# Patient Record
Sex: FEMALE | Race: WHITE | URBAN - METROPOLITAN AREA
[De-identification: names, ages, dates, MRNs, and addresses within clinical notes are randomized per-mention and may not be internally consistent; named-entity substitution may affect disease eponyms.]

---

## 2024-04-04 ENCOUNTER — APPOINTMENT (OUTPATIENT)
Dept: GENERAL RADIOLOGY | Age: 44
DRG: 917 | End: 2024-04-04
Payer: MEDICARE

## 2024-04-04 ENCOUNTER — HOSPITAL ENCOUNTER (INPATIENT)
Age: 44
LOS: 5 days | Discharge: PSYCHIATRIC OR SUBSTANCE USE FACILITY | DRG: 917 | End: 2024-04-09
Attending: EMERGENCY MEDICINE | Admitting: INTERNAL MEDICINE
Payer: MEDICARE

## 2024-04-04 ENCOUNTER — APPOINTMENT (OUTPATIENT)
Dept: CT IMAGING | Age: 44
DRG: 917 | End: 2024-04-04
Payer: MEDICARE

## 2024-04-04 DIAGNOSIS — R41.82 ALTERED MENTAL STATUS, UNSPECIFIED ALTERED MENTAL STATUS TYPE: Primary | ICD-10-CM

## 2024-04-04 LAB
ALBUMIN SERPL-MCNC: 4.8 G/DL (ref 3.5–5.2)
ALBUMIN/GLOB SERPL: 2 {RATIO} (ref 1–2.5)
ALP SERPL-CCNC: 65 U/L (ref 35–104)
ALT SERPL-CCNC: 25 U/L (ref 10–35)
AMPHET UR QL SCN: NEGATIVE
ANION GAP SERPL CALCULATED.3IONS-SCNC: 13 MMOL/L (ref 9–16)
APAP SERPL-MCNC: <5 UG/ML (ref 10–30)
AST SERPL-CCNC: 25 U/L (ref 10–35)
BACTERIA URNS QL MICRO: NORMAL
BARBITURATES UR QL SCN: NEGATIVE
BASOPHILS # BLD: 0.04 K/UL (ref 0–0.2)
BASOPHILS NFR BLD: 1 % (ref 0–2)
BENZODIAZ UR QL: NEGATIVE
BILIRUB DIRECT SERPL-MCNC: <0.2 MG/DL (ref 0–0.3)
BILIRUB INDIRECT SERPL-MCNC: 0.3 MG/DL (ref 0–1)
BILIRUB SERPL-MCNC: 0.5 MG/DL (ref 0–1.2)
BILIRUB UR QL STRIP: NEGATIVE
BUN BLD-MCNC: 11 MG/DL (ref 8–26)
BUN SERPL-MCNC: 11 MG/DL (ref 6–20)
CA-I BLD-SCNC: 1.25 MMOL/L (ref 1.15–1.33)
CALCIUM SERPL-MCNC: 9.9 MG/DL (ref 8.6–10.4)
CANNABINOIDS UR QL SCN: POSITIVE
CASTS #/AREA URNS LPF: NORMAL /LPF (ref 0–8)
CHLORIDE BLD-SCNC: 108 MMOL/L (ref 98–107)
CHLORIDE SERPL-SCNC: 105 MMOL/L (ref 98–107)
CLARITY UR: ABNORMAL
CO2 BLD CALC-SCNC: 27 MMOL/L (ref 22–30)
CO2 SERPL-SCNC: 23 MMOL/L (ref 20–31)
COCAINE UR QL SCN: NEGATIVE
COLOR UR: YELLOW
CREAT SERPL-MCNC: 0.6 MG/DL (ref 0.5–0.9)
EGFR, POC: 61 ML/MIN/1.73M2
EOSINOPHIL # BLD: 0.04 K/UL (ref 0–0.44)
EOSINOPHILS RELATIVE PERCENT: 1 % (ref 1–4)
EPI CELLS #/AREA URNS HPF: NORMAL /HPF (ref 0–5)
ERYTHROCYTE [DISTWIDTH] IN BLOOD BY AUTOMATED COUNT: 12.7 % (ref 11.8–14.4)
ETHANOL PERCENT: <0.01 %
ETHANOLAMINE SERPL-MCNC: <10 MG/DL
FENTANYL UR QL: NEGATIVE
FIO2: 100
GFR SERPL CREATININE-BSD FRML MDRD: 60 ML/MIN/1.73M2
GLOBULIN SER CALC-MCNC: 2.3 G/DL
GLUCOSE BLD-MCNC: 114 MG/DL (ref 74–100)
GLUCOSE BLD-MCNC: 124 MG/DL (ref 74–100)
GLUCOSE SERPL-MCNC: 121 MG/DL (ref 74–99)
GLUCOSE UR STRIP-MCNC: NEGATIVE MG/DL
HCG SERPL QL: NEGATIVE
HCO3 VENOUS: 27.9 MMOL/L (ref 22–29)
HCT VFR BLD AUTO: 41 % (ref 36–46)
HCT VFR BLD AUTO: 41.1 % (ref 36.3–47.1)
HGB BLD-MCNC: 13.8 G/DL (ref 11.9–15.1)
HGB UR QL STRIP.AUTO: NEGATIVE
IMM GRANULOCYTES # BLD AUTO: 0.03 K/UL (ref 0–0.3)
IMM GRANULOCYTES NFR BLD: 0 %
INR PPP: 1
KETONES UR STRIP-MCNC: NEGATIVE MG/DL
LEUKOCYTE ESTERASE UR QL STRIP: NEGATIVE
LIPASE SERPL-CCNC: 27 U/L (ref 13–60)
LITHIUM LEVEL: 0.3 MMOL/L (ref 0.6–1.2)
LYMPHOCYTES NFR BLD: 1.13 K/UL (ref 1.1–3.7)
LYMPHOCYTES RELATIVE PERCENT: 13 % (ref 24–43)
MAGNESIUM SERPL-MCNC: 2.4 MG/DL (ref 1.6–2.6)
MCH RBC QN AUTO: 33.6 PG (ref 25.2–33.5)
MCHC RBC AUTO-ENTMCNC: 33.6 G/DL (ref 28.4–34.8)
MCV RBC AUTO: 100 FL (ref 82.6–102.9)
METHADONE UR QL: NEGATIVE
MODE: ABNORMAL
MONOCYTES NFR BLD: 0.5 K/UL (ref 0.1–1.2)
MONOCYTES NFR BLD: 6 % (ref 3–12)
NEGATIVE BASE EXCESS, ART: 1.1 MMOL/L (ref 0–2)
NEUTROPHILS NFR BLD: 79 % (ref 36–65)
NEUTS SEG NFR BLD: 6.96 K/UL (ref 1.5–8.1)
NITRITE UR QL STRIP: NEGATIVE
NRBC BLD-RTO: 0 PER 100 WBC
O2 DELIVERY DEVICE: ABNORMAL
O2 SAT, VEN: 50 % (ref 60–85)
OPIATES UR QL SCN: NEGATIVE
OXYCODONE UR QL SCN: NEGATIVE
PCO2, VEN: 37.3 MM HG (ref 41–51)
PCP UR QL SCN: NEGATIVE
PH UR STRIP: 7.5 [PH] (ref 5–8)
PH VENOUS: 7.48 (ref 7.32–7.43)
PLATELET # BLD AUTO: 280 K/UL (ref 138–453)
PMV BLD AUTO: 8.5 FL (ref 8.1–13.5)
PO2, VEN: 24.7 MM HG (ref 30–50)
POC ANION GAP: 8 MMOL/L (ref 7–16)
POC CREATININE: 0.6 MG/DL (ref 0.51–1.19)
POC HCO3: 24 MMOL/L (ref 21–28)
POC HEMOGLOBIN (CALC): 13.9 G/DL (ref 12–16)
POC LACTIC ACID: 0.8 MMOL/L (ref 0.56–1.39)
POC O2 SATURATION: 100 % (ref 94–98)
POC PCO2: 40.7 MM HG (ref 35–48)
POC PH: 7.38 (ref 7.35–7.45)
POC PO2: 648 MM HG (ref 83–108)
POSITIVE BASE EXCESS, VEN: 4.3 MMOL/L (ref 0–3)
POTASSIUM BLD-SCNC: 4.9 MMOL/L (ref 3.5–4.5)
POTASSIUM SERPL-SCNC: 4 MMOL/L (ref 3.7–5.3)
PROT SERPL-MCNC: 7.1 G/DL (ref 6.6–8.7)
PROT UR STRIP-MCNC: ABNORMAL MG/DL
PROTHROMBIN TIME: 13.4 SEC (ref 11.7–14.9)
RBC # BLD AUTO: 4.11 M/UL (ref 3.95–5.11)
RBC #/AREA URNS HPF: NORMAL /HPF (ref 0–4)
SALICYLATES SERPL-MCNC: <0.5 MG/DL (ref 0–10)
SODIUM BLD-SCNC: 142 MMOL/L (ref 138–146)
SODIUM SERPL-SCNC: 141 MMOL/L (ref 136–145)
SP GR UR STRIP: 1.01 (ref 1–1.03)
TEST INFORMATION: ABNORMAL
TROPONIN I SERPL HS-MCNC: <6 NG/L (ref 0–14)
TROPONIN I SERPL HS-MCNC: <6 NG/L (ref 0–14)
TSH SERPL DL<=0.05 MIU/L-ACNC: 3.49 UIU/ML (ref 0.27–4.2)
UROBILINOGEN UR STRIP-ACNC: NORMAL EU/DL (ref 0–1)
WBC #/AREA URNS HPF: NORMAL /HPF (ref 0–5)
WBC OTHER # BLD: 8.7 K/UL (ref 3.5–11.3)

## 2024-04-04 PROCEDURE — 84520 ASSAY OF UREA NITROGEN: CPT

## 2024-04-04 PROCEDURE — 2580000003 HC RX 258

## 2024-04-04 PROCEDURE — G0480 DRUG TEST DEF 1-7 CLASSES: HCPCS

## 2024-04-04 PROCEDURE — 85025 COMPLETE CBC W/AUTO DIFF WBC: CPT

## 2024-04-04 PROCEDURE — 83605 ASSAY OF LACTIC ACID: CPT

## 2024-04-04 PROCEDURE — 87205 SMEAR GRAM STAIN: CPT

## 2024-04-04 PROCEDURE — 71045 X-RAY EXAM CHEST 1 VIEW: CPT

## 2024-04-04 PROCEDURE — 82947 ASSAY GLUCOSE BLOOD QUANT: CPT

## 2024-04-04 PROCEDURE — 6360000002 HC RX W HCPCS: Performed by: STUDENT IN AN ORGANIZED HEALTH CARE EDUCATION/TRAINING PROGRAM

## 2024-04-04 PROCEDURE — 87040 BLOOD CULTURE FOR BACTERIA: CPT

## 2024-04-04 PROCEDURE — 74018 RADEX ABDOMEN 1 VIEW: CPT

## 2024-04-04 PROCEDURE — 36415 COLL VENOUS BLD VENIPUNCTURE: CPT

## 2024-04-04 PROCEDURE — 6370000000 HC RX 637 (ALT 250 FOR IP)

## 2024-04-04 PROCEDURE — 84703 CHORIONIC GONADOTROPIN ASSAY: CPT

## 2024-04-04 PROCEDURE — 80143 DRUG ASSAY ACETAMINOPHEN: CPT

## 2024-04-04 PROCEDURE — 2700000000 HC OXYGEN THERAPY PER DAY

## 2024-04-04 PROCEDURE — 82330 ASSAY OF CALCIUM: CPT

## 2024-04-04 PROCEDURE — 84484 ASSAY OF TROPONIN QUANT: CPT

## 2024-04-04 PROCEDURE — 85610 PROTHROMBIN TIME: CPT

## 2024-04-04 PROCEDURE — 36600 WITHDRAWAL OF ARTERIAL BLOOD: CPT

## 2024-04-04 PROCEDURE — 6360000002 HC RX W HCPCS

## 2024-04-04 PROCEDURE — 80178 ASSAY OF LITHIUM: CPT

## 2024-04-04 PROCEDURE — 81001 URINALYSIS AUTO W/SCOPE: CPT

## 2024-04-04 PROCEDURE — 82803 BLOOD GASES ANY COMBINATION: CPT

## 2024-04-04 PROCEDURE — 87186 SC STD MICRODIL/AGAR DIL: CPT

## 2024-04-04 PROCEDURE — 94002 VENT MGMT INPAT INIT DAY: CPT

## 2024-04-04 PROCEDURE — 96375 TX/PRO/DX INJ NEW DRUG ADDON: CPT

## 2024-04-04 PROCEDURE — 87086 URINE CULTURE/COLONY COUNT: CPT

## 2024-04-04 PROCEDURE — 87077 CULTURE AEROBIC IDENTIFY: CPT

## 2024-04-04 PROCEDURE — 87070 CULTURE OTHR SPECIMN AEROBIC: CPT

## 2024-04-04 PROCEDURE — 70450 CT HEAD/BRAIN W/O DYE: CPT

## 2024-04-04 PROCEDURE — 84443 ASSAY THYROID STIM HORMONE: CPT

## 2024-04-04 PROCEDURE — 80048 BASIC METABOLIC PNL TOTAL CA: CPT

## 2024-04-04 PROCEDURE — 80307 DRUG TEST PRSMV CHEM ANLYZR: CPT

## 2024-04-04 PROCEDURE — 87641 MR-STAPH DNA AMP PROBE: CPT

## 2024-04-04 PROCEDURE — 2500000003 HC RX 250 WO HCPCS

## 2024-04-04 PROCEDURE — 85014 HEMATOCRIT: CPT

## 2024-04-04 PROCEDURE — 2000000000 HC ICU R&B

## 2024-04-04 PROCEDURE — 80076 HEPATIC FUNCTION PANEL: CPT

## 2024-04-04 PROCEDURE — 80179 DRUG ASSAY SALICYLATE: CPT

## 2024-04-04 PROCEDURE — 93005 ELECTROCARDIOGRAM TRACING: CPT | Performed by: INTERNAL MEDICINE

## 2024-04-04 PROCEDURE — 83690 ASSAY OF LIPASE: CPT

## 2024-04-04 PROCEDURE — 2500000003 HC RX 250 WO HCPCS: Performed by: STUDENT IN AN ORGANIZED HEALTH CARE EDUCATION/TRAINING PROGRAM

## 2024-04-04 PROCEDURE — 99285 EMERGENCY DEPT VISIT HI MDM: CPT

## 2024-04-04 PROCEDURE — 83735 ASSAY OF MAGNESIUM: CPT

## 2024-04-04 PROCEDURE — 94761 N-INVAS EAR/PLS OXIMETRY MLT: CPT

## 2024-04-04 PROCEDURE — 2580000003 HC RX 258: Performed by: STUDENT IN AN ORGANIZED HEALTH CARE EDUCATION/TRAINING PROGRAM

## 2024-04-04 PROCEDURE — 96374 THER/PROPH/DIAG INJ IV PUSH: CPT

## 2024-04-04 PROCEDURE — 82565 ASSAY OF CREATININE: CPT

## 2024-04-04 PROCEDURE — 80051 ELECTROLYTE PANEL: CPT

## 2024-04-04 RX ORDER — ACTIVATED CHARCOAL 208 MG/ML
SUSPENSION ORAL
Status: COMPLETED
Start: 2024-04-04 | End: 2024-04-04

## 2024-04-04 RX ORDER — SODIUM CHLORIDE 0.9 % (FLUSH) 0.9 %
5-40 SYRINGE (ML) INJECTION EVERY 12 HOURS SCHEDULED
Status: DISCONTINUED | OUTPATIENT
Start: 2024-04-04 | End: 2024-04-09 | Stop reason: HOSPADM

## 2024-04-04 RX ORDER — ONDANSETRON 4 MG/1
4 TABLET, ORALLY DISINTEGRATING ORAL EVERY 8 HOURS PRN
Status: DISCONTINUED | OUTPATIENT
Start: 2024-04-04 | End: 2024-04-09 | Stop reason: HOSPADM

## 2024-04-04 RX ORDER — 0.9 % SODIUM CHLORIDE 0.9 %
1000 INTRAVENOUS SOLUTION INTRAVENOUS ONCE
Status: COMPLETED | OUTPATIENT
Start: 2024-04-04 | End: 2024-04-04

## 2024-04-04 RX ORDER — ACTIVATED CHARCOAL 208 MG/ML
25 SUSPENSION ORAL ONCE
Status: COMPLETED | OUTPATIENT
Start: 2024-04-04 | End: 2024-04-04

## 2024-04-04 RX ORDER — ACETAMINOPHEN 325 MG/1
650 TABLET ORAL EVERY 6 HOURS PRN
Status: DISCONTINUED | OUTPATIENT
Start: 2024-04-04 | End: 2024-04-09 | Stop reason: HOSPADM

## 2024-04-04 RX ORDER — SODIUM CHLORIDE, SODIUM LACTATE, POTASSIUM CHLORIDE, CALCIUM CHLORIDE 600; 310; 30; 20 MG/100ML; MG/100ML; MG/100ML; MG/100ML
INJECTION, SOLUTION INTRAVENOUS CONTINUOUS
Status: DISCONTINUED | OUTPATIENT
Start: 2024-04-04 | End: 2024-04-09 | Stop reason: HOSPADM

## 2024-04-04 RX ORDER — ONDANSETRON 2 MG/ML
4 INJECTION INTRAMUSCULAR; INTRAVENOUS ONCE
Status: COMPLETED | OUTPATIENT
Start: 2024-04-04 | End: 2024-04-04

## 2024-04-04 RX ORDER — POTASSIUM CHLORIDE 7.45 MG/ML
10 INJECTION INTRAVENOUS PRN
Status: DISCONTINUED | OUTPATIENT
Start: 2024-04-04 | End: 2024-04-09 | Stop reason: HOSPADM

## 2024-04-04 RX ORDER — HYDROXYZINE HYDROCHLORIDE 50 MG/ML
25 INJECTION, SOLUTION INTRAMUSCULAR ONCE
Status: DISCONTINUED | OUTPATIENT
Start: 2024-04-04 | End: 2024-04-04

## 2024-04-04 RX ORDER — FENTANYL CITRATE 50 UG/ML
INJECTION, SOLUTION INTRAMUSCULAR; INTRAVENOUS
Status: COMPLETED
Start: 2024-04-04 | End: 2024-04-04

## 2024-04-04 RX ORDER — SODIUM CHLORIDE 9 MG/ML
INJECTION, SOLUTION INTRAVENOUS PRN
Status: DISCONTINUED | OUTPATIENT
Start: 2024-04-04 | End: 2024-04-09 | Stop reason: HOSPADM

## 2024-04-04 RX ORDER — FENTANYL CITRATE 50 UG/ML
100 INJECTION, SOLUTION INTRAMUSCULAR; INTRAVENOUS ONCE
Status: COMPLETED | OUTPATIENT
Start: 2024-04-04 | End: 2024-04-04

## 2024-04-04 RX ORDER — ETOMIDATE 2 MG/ML
20 INJECTION INTRAVENOUS ONCE
Status: COMPLETED | OUTPATIENT
Start: 2024-04-04 | End: 2024-04-04

## 2024-04-04 RX ORDER — PROPOFOL 10 MG/ML
5-50 INJECTION, EMULSION INTRAVENOUS CONTINUOUS
Status: DISCONTINUED | OUTPATIENT
Start: 2024-04-04 | End: 2024-04-08

## 2024-04-04 RX ORDER — NALOXONE HYDROCHLORIDE 0.4 MG/ML
0.4 INJECTION, SOLUTION INTRAMUSCULAR; INTRAVENOUS; SUBCUTANEOUS ONCE
Status: COMPLETED | OUTPATIENT
Start: 2024-04-04 | End: 2024-04-04

## 2024-04-04 RX ORDER — ACETAMINOPHEN 650 MG/1
650 SUPPOSITORY RECTAL EVERY 6 HOURS PRN
Status: DISCONTINUED | OUTPATIENT
Start: 2024-04-04 | End: 2024-04-09 | Stop reason: HOSPADM

## 2024-04-04 RX ORDER — PROPOFOL 10 MG/ML
5-50 INJECTION, EMULSION INTRAVENOUS CONTINUOUS
Status: DISCONTINUED | OUTPATIENT
Start: 2024-04-04 | End: 2024-04-04

## 2024-04-04 RX ORDER — SODIUM CHLORIDE 0.9 % (FLUSH) 0.9 %
5-40 SYRINGE (ML) INJECTION PRN
Status: DISCONTINUED | OUTPATIENT
Start: 2024-04-04 | End: 2024-04-09 | Stop reason: HOSPADM

## 2024-04-04 RX ORDER — ONDANSETRON 2 MG/ML
INJECTION INTRAMUSCULAR; INTRAVENOUS
Status: COMPLETED
Start: 2024-04-04 | End: 2024-04-04

## 2024-04-04 RX ORDER — NALOXONE HYDROCHLORIDE 0.4 MG/ML
INJECTION, SOLUTION INTRAMUSCULAR; INTRAVENOUS; SUBCUTANEOUS
Status: COMPLETED
Start: 2024-04-04 | End: 2024-04-04

## 2024-04-04 RX ORDER — POTASSIUM CHLORIDE 29.8 MG/ML
20 INJECTION INTRAVENOUS PRN
Status: DISCONTINUED | OUTPATIENT
Start: 2024-04-04 | End: 2024-04-09 | Stop reason: HOSPADM

## 2024-04-04 RX ORDER — SUCCINYLCHOLINE CHLORIDE 20 MG/ML
100 INJECTION INTRAMUSCULAR; INTRAVENOUS ONCE
Status: COMPLETED | OUTPATIENT
Start: 2024-04-04 | End: 2024-04-04

## 2024-04-04 RX ORDER — PROPOFOL 10 MG/ML
INJECTION, EMULSION INTRAVENOUS
Status: COMPLETED
Start: 2024-04-04 | End: 2024-04-04

## 2024-04-04 RX ORDER — ONDANSETRON 2 MG/ML
4 INJECTION INTRAMUSCULAR; INTRAVENOUS EVERY 6 HOURS PRN
Status: DISCONTINUED | OUTPATIENT
Start: 2024-04-04 | End: 2024-04-09 | Stop reason: HOSPADM

## 2024-04-04 RX ORDER — POLYETHYLENE GLYCOL 3350 17 G/17G
17 POWDER, FOR SOLUTION ORAL DAILY PRN
Status: DISCONTINUED | OUTPATIENT
Start: 2024-04-04 | End: 2024-04-09 | Stop reason: HOSPADM

## 2024-04-04 RX ORDER — MAGNESIUM SULFATE IN WATER 40 MG/ML
2000 INJECTION, SOLUTION INTRAVENOUS PRN
Status: DISCONTINUED | OUTPATIENT
Start: 2024-04-04 | End: 2024-04-09 | Stop reason: HOSPADM

## 2024-04-04 RX ORDER — ENOXAPARIN SODIUM 100 MG/ML
40 INJECTION SUBCUTANEOUS DAILY
Status: DISCONTINUED | OUTPATIENT
Start: 2024-04-05 | End: 2024-04-09 | Stop reason: HOSPADM

## 2024-04-04 RX ADMIN — SODIUM CHLORIDE, PRESERVATIVE FREE 10 ML: 5 INJECTION INTRAVENOUS at 23:42

## 2024-04-04 RX ADMIN — SODIUM CHLORIDE, POTASSIUM CHLORIDE, SODIUM LACTATE AND CALCIUM CHLORIDE: 600; 310; 30; 20 INJECTION, SOLUTION INTRAVENOUS at 22:18

## 2024-04-04 RX ADMIN — SODIUM CHLORIDE 3000 MG: 900 INJECTION INTRAVENOUS at 23:41

## 2024-04-04 RX ADMIN — FENTANYL CITRATE 100 MCG: 50 INJECTION, SOLUTION INTRAMUSCULAR; INTRAVENOUS at 18:28

## 2024-04-04 RX ADMIN — SODIUM CHLORIDE: 9 INJECTION, SOLUTION INTRAVENOUS at 23:38

## 2024-04-04 RX ADMIN — ACTIVATED CHARCOAL 25 G: 208 SUSPENSION ORAL at 18:42

## 2024-04-04 RX ADMIN — Medication 100 MG: at 18:07

## 2024-04-04 RX ADMIN — ONDANSETRON 4 MG: 2 INJECTION INTRAMUSCULAR; INTRAVENOUS at 17:43

## 2024-04-04 RX ADMIN — SODIUM CHLORIDE 1000 ML: 9 INJECTION, SOLUTION INTRAVENOUS at 18:43

## 2024-04-04 RX ADMIN — ETOMIDATE INJECTION 20 MG: 2 SOLUTION INTRAVENOUS at 18:06

## 2024-04-04 RX ADMIN — Medication 50 MCG/HR: at 18:59

## 2024-04-04 RX ADMIN — PROPOFOL 40 MCG/KG/MIN: 10 INJECTION, EMULSION INTRAVENOUS at 18:15

## 2024-04-04 RX ADMIN — NALOXONE HYDROCHLORIDE 0.4 MG: 0.4 INJECTION, SOLUTION INTRAMUSCULAR; INTRAVENOUS; SUBCUTANEOUS at 17:44

## 2024-04-04 RX ADMIN — NALXONE HYDROCHLORIDE 0.4 MG: 0.4 INJECTION INTRAMUSCULAR; INTRAVENOUS; SUBCUTANEOUS at 17:44

## 2024-04-04 ASSESSMENT — PULMONARY FUNCTION TESTS
PIF_VALUE: 13
PIF_VALUE: 16
PIF_VALUE: 18

## 2024-04-04 NOTE — ED PROVIDER NOTES
[facility]     Emergency Department     Faculty Attestation    I performed a history and physical examination of the patient and discussed management with the resident. I reviewed the resident’s note and agree with the documented findings and plan of care. Any areas of disagreement are noted on the chart. I was personally present for the key portions of any procedures. I have documented in the chart those procedures where I was not present during the key portions. I have reviewed the emergency nurses triage note. I agree with the chief complaint, past medical history, past surgical history, allergies, medications, social and family history as documented unless otherwise noted below.        For Physician Assistant/ Nurse Practitioner cases/documentation I have personally evaluated this patient and have completed at least one if not all key elements of the E/M (history, physical exam, and MDM). Additional findings are as noted.  I have personally seen and evaluated the patient.  I find the patient's history and physical exam are consistent with the NP/PA documentation.  I agree with the care provided, treatment rendered, disposition and follow-up plan.    The patient arrives from hospital floor where she was a visitor noted to be altered.  Little more is known at the time there is no family initially available. patient is clearly awake and is vomiting but withholding her vomiting in her mouth she is otherwise not communicating with us.  Behavior appears to be quite erratic at this time       1820  The patient continued to vomit continue to have episodes of desaturation into the 80s remained unresponsive and uncooperative at times with unusual behavior and at this point we did feel airway was at that risk in addition needs diagnostics  get the patient to CT scan and to further evaluations which were impossible with the spontaneous activity.      The patient's presentation is not consistent 
  Food Insecurity: Not on file   Transportation Needs: Not on file   Physical Activity: Not on file   Stress: Not on file   Social Connections: Not on file   Intimate Partner Violence: Not on file   Housing Stability: Not on file       No family history on file.    Allergies:  Patient has no allergy information on record.    Home Medications:  Prior to Admission medications    Not on File       REVIEW OF SYSTEMS       Review of Systems   Unable to perform ROS: Mental status change       PHYSICAL EXAM      INITIAL VITALS:   BP (!) 147/73   Pulse (!) 49   Temp 98.2 °F (36.8 °C) (Core)   Resp 14   Ht 1.524 m (5')   Wt 44.4 kg (97 lb 14.2 oz)   SpO2 100%   BMI 19.12 kg/m²     Physical Exam  Vitals reviewed.   Constitutional:       Comments: Awake, looking around the room, moving all extremities, not following commands   HENT:      Head: Normocephalic and atraumatic.      Right Ear: External ear normal.      Left Ear: External ear normal.      Nose: Nose normal.      Mouth/Throat:      Mouth: Mucous membranes are moist.   Eyes:      General:         Right eye: No discharge.         Left eye: No discharge.      Comments: Pupils midsize and reactive bilaterally   Cardiovascular:      Rate and Rhythm: Normal rate and regular rhythm.      Pulses: Normal pulses.   Pulmonary:      Effort: Pulmonary effort is normal. No respiratory distress.   Abdominal:      General: There is no distension.      Palpations: Abdomen is soft.      Tenderness: There is no abdominal tenderness.   Musculoskeletal:      Cervical back: Normal range of motion.      Comments: Moving all 4 extremities   Skin:     General: Skin is warm.      Capillary Refill: Capillary refill takes less than 2 seconds.   Neurological:      Mental Status: She is alert.      GCS: GCS eye subscore is 4. GCS verbal subscore is 3. GCS motor subscore is 5.      Comments: Altered, exam appears nonfocal           DDX/DIAGNOSTIC RESULTS / EMERGENCY DEPARTMENT COURSE / MDM

## 2024-04-04 NOTE — ED NOTES
Ready for intubation  50mg of Etomidate given IVP by Kathryn GARZA    Oxygen on patient   Assisting ventilations with Bag valve mask

## 2024-04-04 NOTE — ED NOTES
Succs given IVP per Kathryn GARZA    Attempting intubation per Dr. Ledbetter with glide scope  SaO2 96%  SaO2 99%  #7.0  ETT placed per Dr. Ledbetter, 21cm at teeth  Assisting ventilations with BVM  O2 Sat remains at 100%

## 2024-04-04 NOTE — ED NOTES
Patient acting more awake but unable to answer any questions  Moving extremities spontaneously but not really to command  Patient is moving all over stretcher  Has had 3-4 episodes of emesis  Patient positioned for comfort, sitting up in bed

## 2024-04-04 NOTE — ED NOTES
Patient will be intubated to protect airway due to continued altered mental status and O2 sat was 82% when patient began to rest easier  Dr. Izquierdo and Dr. Ledbetter are preparing for intubation  Tiffanie present

## 2024-04-04 NOTE — ED NOTES
Patient presents to room 26 on stretcher after rapid response while patient was visiting other in hospital  Patient had been acting wnl then later was found slumped in chair   Patient brought to ER  Patient is alert but incoherent  Dr Ledbetter at bedside along with pharmacist and Dr. Izquierdo

## 2024-04-05 ENCOUNTER — APPOINTMENT (OUTPATIENT)
Age: 44
DRG: 917 | End: 2024-04-05
Payer: MEDICARE

## 2024-04-05 ENCOUNTER — APPOINTMENT (OUTPATIENT)
Dept: GENERAL RADIOLOGY | Age: 44
DRG: 917 | End: 2024-04-05
Payer: MEDICARE

## 2024-04-05 LAB
ALLEN TEST: POSITIVE
ANION GAP SERPL CALCULATED.3IONS-SCNC: 11 MMOL/L (ref 9–16)
BASOPHILS # BLD: 0.04 K/UL (ref 0–0.2)
BASOPHILS NFR BLD: 1 % (ref 0–2)
BUN SERPL-MCNC: 7 MG/DL (ref 6–20)
CALCIUM SERPL-MCNC: 8.5 MG/DL (ref 8.6–10.4)
CHLORIDE SERPL-SCNC: 111 MMOL/L (ref 98–107)
CO2 SERPL-SCNC: 20 MMOL/L (ref 20–31)
CREAT SERPL-MCNC: 0.5 MG/DL (ref 0.5–0.9)
EKG ATRIAL RATE: 58 BPM
EKG P AXIS: 68 DEGREES
EKG P-R INTERVAL: 126 MS
EKG Q-T INTERVAL: 426 MS
EKG QRS DURATION: 76 MS
EKG QTC CALCULATION (BAZETT): 418 MS
EKG R AXIS: 75 DEGREES
EKG T AXIS: 73 DEGREES
EKG VENTRICULAR RATE: 58 BPM
EOSINOPHIL # BLD: 0.09 K/UL (ref 0–0.44)
EOSINOPHILS RELATIVE PERCENT: 1 % (ref 1–4)
ERYTHROCYTE [DISTWIDTH] IN BLOOD BY AUTOMATED COUNT: 12.6 % (ref 11.8–14.4)
FIO2: 30
GFR SERPL CREATININE-BSD FRML MDRD: >90 ML/MIN/1.73M2
GLUCOSE BLD-MCNC: 102 MG/DL (ref 65–105)
GLUCOSE BLD-MCNC: 102 MG/DL (ref 74–100)
GLUCOSE BLD-MCNC: 104 MG/DL (ref 65–105)
GLUCOSE BLD-MCNC: 81 MG/DL (ref 65–105)
GLUCOSE SERPL-MCNC: 105 MG/DL (ref 74–99)
HCO3 VENOUS: 26.4 MMOL/L (ref 22–29)
HCT VFR BLD AUTO: 35.1 % (ref 36.3–47.1)
HGB BLD-MCNC: 11.1 G/DL (ref 11.9–15.1)
IMM GRANULOCYTES # BLD AUTO: 0.05 K/UL (ref 0–0.3)
IMM GRANULOCYTES NFR BLD: 1 %
LACTIC ACID, WHOLE BLOOD: 1.1 MMOL/L (ref 0.7–2.1)
LACTIC ACID, WHOLE BLOOD: 1.8 MMOL/L (ref 0.7–2.1)
LACTIC ACID, WHOLE BLOOD: 2.6 MMOL/L (ref 0.7–2.1)
LITHIUM LEVEL: 0.2 MMOL/L (ref 0.6–1.2)
LYMPHOCYTES NFR BLD: 1.35 K/UL (ref 1.1–3.7)
LYMPHOCYTES RELATIVE PERCENT: 15 % (ref 24–43)
MAGNESIUM SERPL-MCNC: 2.1 MG/DL (ref 1.6–2.6)
MCH RBC QN AUTO: 33.3 PG (ref 25.2–33.5)
MCHC RBC AUTO-ENTMCNC: 31.6 G/DL (ref 28.4–34.8)
MCV RBC AUTO: 105.4 FL (ref 82.6–102.9)
MICROORGANISM SPEC CULT: NO GROWTH
MONOCYTES NFR BLD: 0.68 K/UL (ref 0.1–1.2)
MONOCYTES NFR BLD: 8 % (ref 3–12)
MRSA, DNA, NASAL: NEGATIVE
NEUTROPHILS NFR BLD: 75 % (ref 36–65)
NEUTS SEG NFR BLD: 6.6 K/UL (ref 1.5–8.1)
NRBC BLD-RTO: 0 PER 100 WBC
O2 DELIVERY DEVICE: NORMAL
O2 SAT, VEN: 75.6 % (ref 60–85)
PCO2, VEN: 41.7 MM HG (ref 41–51)
PH VENOUS: 7.41 (ref 7.32–7.43)
PLATELET # BLD AUTO: 222 K/UL (ref 138–453)
PMV BLD AUTO: 8.6 FL (ref 8.1–13.5)
PO2, VEN: 40.2 MM HG (ref 30–50)
POSITIVE BASE EXCESS, VEN: 1.5 MMOL/L (ref 0–3)
POTASSIUM SERPL-SCNC: 3.5 MMOL/L (ref 3.7–5.3)
RBC # BLD AUTO: 3.33 M/UL (ref 3.95–5.11)
RBC # BLD: ABNORMAL 10*6/UL
SAMPLE SITE: NORMAL
SODIUM SERPL-SCNC: 142 MMOL/L (ref 136–145)
SPECIMEN DESCRIPTION: NORMAL
SPECIMEN DESCRIPTION: NORMAL
TROPONIN I SERPL HS-MCNC: 10 NG/L (ref 0–14)
TROPONIN I SERPL HS-MCNC: <6 NG/L (ref 0–14)
WBC OTHER # BLD: 8.8 K/UL (ref 3.5–11.3)

## 2024-04-05 PROCEDURE — 6370000000 HC RX 637 (ALT 250 FOR IP)

## 2024-04-05 PROCEDURE — 2700000000 HC OXYGEN THERAPY PER DAY

## 2024-04-05 PROCEDURE — 82803 BLOOD GASES ANY COMBINATION: CPT

## 2024-04-05 PROCEDURE — 83735 ASSAY OF MAGNESIUM: CPT

## 2024-04-05 PROCEDURE — 5A1945Z RESPIRATORY VENTILATION, 24-96 CONSECUTIVE HOURS: ICD-10-PCS | Performed by: EMERGENCY MEDICINE

## 2024-04-05 PROCEDURE — 85025 COMPLETE CBC W/AUTO DIFF WBC: CPT

## 2024-04-05 PROCEDURE — 80048 BASIC METABOLIC PNL TOTAL CA: CPT

## 2024-04-05 PROCEDURE — 2500000003 HC RX 250 WO HCPCS

## 2024-04-05 PROCEDURE — 83605 ASSAY OF LACTIC ACID: CPT

## 2024-04-05 PROCEDURE — 84484 ASSAY OF TROPONIN QUANT: CPT

## 2024-04-05 PROCEDURE — 2000000000 HC ICU R&B

## 2024-04-05 PROCEDURE — 93010 ELECTROCARDIOGRAM REPORT: CPT | Performed by: INTERNAL MEDICINE

## 2024-04-05 PROCEDURE — 94003 VENT MGMT INPAT SUBQ DAY: CPT

## 2024-04-05 PROCEDURE — 0BH17EZ INSERTION OF ENDOTRACHEAL AIRWAY INTO TRACHEA, VIA NATURAL OR ARTIFICIAL OPENING: ICD-10-PCS | Performed by: EMERGENCY MEDICINE

## 2024-04-05 PROCEDURE — 94761 N-INVAS EAR/PLS OXIMETRY MLT: CPT

## 2024-04-05 PROCEDURE — 2580000003 HC RX 258

## 2024-04-05 PROCEDURE — 6360000002 HC RX W HCPCS

## 2024-04-05 PROCEDURE — 36600 WITHDRAWAL OF ARTERIAL BLOOD: CPT

## 2024-04-05 PROCEDURE — 36415 COLL VENOUS BLD VENIPUNCTURE: CPT

## 2024-04-05 PROCEDURE — 6360000002 HC RX W HCPCS: Performed by: STUDENT IN AN ORGANIZED HEALTH CARE EDUCATION/TRAINING PROGRAM

## 2024-04-05 PROCEDURE — 99291 CRITICAL CARE FIRST HOUR: CPT | Performed by: INTERNAL MEDICINE

## 2024-04-05 PROCEDURE — 82947 ASSAY GLUCOSE BLOOD QUANT: CPT

## 2024-04-05 PROCEDURE — 71045 X-RAY EXAM CHEST 1 VIEW: CPT

## 2024-04-05 RX ORDER — MIDAZOLAM HYDROCHLORIDE 1 MG/ML
1-10 INJECTION, SOLUTION INTRAVENOUS CONTINUOUS
Status: DISCONTINUED | OUTPATIENT
Start: 2024-04-05 | End: 2024-04-08

## 2024-04-05 RX ORDER — FENTANYL CITRATE 50 UG/ML
25 INJECTION, SOLUTION INTRAMUSCULAR; INTRAVENOUS
Status: DISCONTINUED | OUTPATIENT
Start: 2024-04-05 | End: 2024-04-08

## 2024-04-05 RX ORDER — TRAZODONE HYDROCHLORIDE 50 MG/1
50 TABLET ORAL NIGHTLY PRN
Status: ON HOLD | COMMUNITY
End: 2024-04-12

## 2024-04-05 RX ORDER — BUTALBITAL, ACETAMINOPHEN AND CAFFEINE 300; 40; 50 MG/1; MG/1; MG/1
2 CAPSULE ORAL EVERY 8 HOURS PRN
COMMUNITY

## 2024-04-05 RX ORDER — MIDAZOLAM HYDROCHLORIDE 2 MG/2ML
2 INJECTION, SOLUTION INTRAMUSCULAR; INTRAVENOUS ONCE
Status: COMPLETED | OUTPATIENT
Start: 2024-04-05 | End: 2024-04-05

## 2024-04-05 RX ORDER — METAXALONE 800 MG/1
800 TABLET ORAL 3 TIMES DAILY
Status: ON HOLD | COMMUNITY
End: 2024-04-12

## 2024-04-05 RX ORDER — MIDAZOLAM HYDROCHLORIDE 2 MG/2ML
2 INJECTION, SOLUTION INTRAMUSCULAR; INTRAVENOUS
Status: DISCONTINUED | OUTPATIENT
Start: 2024-04-05 | End: 2024-04-08

## 2024-04-05 RX ORDER — MIDAZOLAM HYDROCHLORIDE 2 MG/2ML
0.5 INJECTION, SOLUTION INTRAMUSCULAR; INTRAVENOUS EVERY 4 HOURS PRN
Status: DISCONTINUED | OUTPATIENT
Start: 2024-04-05 | End: 2024-04-05

## 2024-04-05 RX ORDER — ACYCLOVIR 400 MG/1
400 TABLET ORAL 2 TIMES DAILY
COMMUNITY

## 2024-04-05 RX ORDER — POTASSIUM CHLORIDE 7.45 MG/ML
10 INJECTION INTRAVENOUS
Status: DISCONTINUED | OUTPATIENT
Start: 2024-04-05 | End: 2024-04-05

## 2024-04-05 RX ORDER — LITHIUM CARBONATE 300 MG/1
300 TABLET, FILM COATED, EXTENDED RELEASE ORAL 3 TIMES DAILY
Status: ON HOLD | COMMUNITY
End: 2024-04-12

## 2024-04-05 RX ORDER — MIDAZOLAM HYDROCHLORIDE 2 MG/2ML
1 INJECTION, SOLUTION INTRAMUSCULAR; INTRAVENOUS
Status: DISCONTINUED | OUTPATIENT
Start: 2024-04-05 | End: 2024-04-05

## 2024-04-05 RX ORDER — ONDANSETRON 8 MG/1
8 TABLET, ORALLY DISINTEGRATING ORAL DAILY PRN
Status: ON HOLD | COMMUNITY
End: 2024-04-12

## 2024-04-05 RX ADMIN — MIDAZOLAM HYDROCHLORIDE 1 MG: 1 INJECTION, SOLUTION INTRAMUSCULAR; INTRAVENOUS at 16:15

## 2024-04-05 RX ADMIN — FENTANYL CITRATE 25 MCG: 50 INJECTION, SOLUTION INTRAMUSCULAR; INTRAVENOUS at 07:49

## 2024-04-05 RX ADMIN — SODIUM CHLORIDE 3000 MG: 900 INJECTION INTRAVENOUS at 16:20

## 2024-04-05 RX ADMIN — MIDAZOLAM HYDROCHLORIDE 2 MG: 1 INJECTION, SOLUTION INTRAMUSCULAR; INTRAVENOUS at 18:42

## 2024-04-05 RX ADMIN — SODIUM CHLORIDE 3000 MG: 900 INJECTION INTRAVENOUS at 09:45

## 2024-04-05 RX ADMIN — SODIUM CHLORIDE: 9 INJECTION, SOLUTION INTRAVENOUS at 18:28

## 2024-04-05 RX ADMIN — PROPOFOL 30 MCG/KG/MIN: 10 INJECTION, EMULSION INTRAVENOUS at 15:38

## 2024-04-05 RX ADMIN — SODIUM CHLORIDE, POTASSIUM CHLORIDE, SODIUM LACTATE AND CALCIUM CHLORIDE: 600; 310; 30; 20 INJECTION, SOLUTION INTRAVENOUS at 05:25

## 2024-04-05 RX ADMIN — FENTANYL CITRATE 25 MCG: 50 INJECTION, SOLUTION INTRAMUSCULAR; INTRAVENOUS at 18:20

## 2024-04-05 RX ADMIN — Medication 125 MCG/HR: at 10:07

## 2024-04-05 RX ADMIN — FENTANYL CITRATE 25 MCG: 50 INJECTION, SOLUTION INTRAMUSCULAR; INTRAVENOUS at 12:34

## 2024-04-05 RX ADMIN — FENTANYL CITRATE 25 MCG: 50 INJECTION, SOLUTION INTRAMUSCULAR; INTRAVENOUS at 20:21

## 2024-04-05 RX ADMIN — SODIUM CHLORIDE, POTASSIUM CHLORIDE, SODIUM LACTATE AND CALCIUM CHLORIDE: 600; 310; 30; 20 INJECTION, SOLUTION INTRAVENOUS at 13:47

## 2024-04-05 RX ADMIN — SODIUM CHLORIDE 3000 MG: 900 INJECTION INTRAVENOUS at 22:25

## 2024-04-05 RX ADMIN — POTASSIUM BICARBONATE 40 MEQ: 782 TABLET, EFFERVESCENT ORAL at 08:27

## 2024-04-05 RX ADMIN — MIDAZOLAM HYDROCHLORIDE 2 MG: 1 INJECTION, SOLUTION INTRAMUSCULAR; INTRAVENOUS at 22:04

## 2024-04-05 RX ADMIN — FENTANYL CITRATE 25 MCG: 50 INJECTION, SOLUTION INTRAMUSCULAR; INTRAVENOUS at 15:44

## 2024-04-05 RX ADMIN — ENOXAPARIN SODIUM 40 MG: 100 INJECTION SUBCUTANEOUS at 08:23

## 2024-04-05 RX ADMIN — SODIUM CHLORIDE 3000 MG: 900 INJECTION INTRAVENOUS at 05:24

## 2024-04-05 RX ADMIN — PROPOFOL 20 MCG/KG/MIN: 10 INJECTION, EMULSION INTRAVENOUS at 04:30

## 2024-04-05 RX ADMIN — MIDAZOLAM HYDROCHLORIDE 2 MG: 1 INJECTION, SOLUTION INTRAMUSCULAR; INTRAVENOUS at 13:03

## 2024-04-05 RX ADMIN — SODIUM CHLORIDE, POTASSIUM CHLORIDE, SODIUM LACTATE AND CALCIUM CHLORIDE: 600; 310; 30; 20 INJECTION, SOLUTION INTRAVENOUS at 21:27

## 2024-04-05 RX ADMIN — Medication 2 MG/HR: at 22:39

## 2024-04-05 RX ADMIN — SODIUM CHLORIDE, PRESERVATIVE FREE 10 ML: 5 INJECTION INTRAVENOUS at 08:26

## 2024-04-05 ASSESSMENT — PULMONARY FUNCTION TESTS
PIF_VALUE: 12
PIF_VALUE: 12
PIF_VALUE: 14
PIF_VALUE: 12
PIF_VALUE: 13
PIF_VALUE: 13

## 2024-04-05 NOTE — FLOWSHEET NOTE
Pt's home medications placed in bag and secured with tape in patients belongings. Medications present are as follows:  Baclofen (49 tabs)  1 jar of miscellaneous pills containing: zofran-1 tab, metaxall 800mg-1 tab, and 2 unable to identify tablets  Acyclovir (15 tabs)  Trazodone (41 Tabs)  1 jar of miscellaneous pills containing: Tizanidine 2mg- 18 tabs, Lithium ER 300mg- 3 tabs, metazalone 800mg- 1 tab, senna- 2 tabs, cetirizine- 1 tab    Electronically signed by Melisa Vergara RN on 4/5/2024 at 12:42 AM

## 2024-04-05 NOTE — CARE COORDINATION
Intubated/ Sedated FiO2 30%, PEEP of 5, OG for TF, monitoring labs. No family present, no emergency contacts listed. Will defer assessment to later time when pt is extubated or emergency contact information is obtained.

## 2024-04-05 NOTE — ED NOTES
Report given to Darshan GARZA  Patient continues with HR 44, Dr. Ledbetter and Dr. Izquierdo aware    Patient to go to CT   Remains intubated with assisted ventilations

## 2024-04-05 NOTE — FLOWSHEET NOTE
Per Dr. Brian's request, writer reached out to pharmacy regarding the pills found in the patient's belongings.  Magdaleno Bridges found the following:    White ES caplet: possible APAP extra strength  Pink : Metaxalone 800mg  Pink LCI/1435: Metaxalone 800mg  Brown N2: Senna 8.6mg  Tan 54 107: Lithium 300mg XR  White 502: Tizanidine 2mg  White 4H2: Cetirizine 10mg   Also found was the patient's significant other's prescription of Baclofen.    Magdaleno Bridges reached out to [facility]  [address], [place]. Below is the most recent medications and the last time they were filled.    Acyclovir 400mg BID  2/14/2024    Fioricet 50/300/40 2 caps q8hr PRN 12/26/2023    Lithium 300mg XR TID  2/3/2024    Metaxalone 800mg TID 11/9/2023    Trazodone 100mg QHS PRN 2/4/2024    Zofran ODT 8mg daily PRN 2/15/2024    There was a prescription for Ajory but was never filled.

## 2024-04-06 ENCOUNTER — APPOINTMENT (OUTPATIENT)
Dept: GENERAL RADIOLOGY | Age: 44
DRG: 917 | End: 2024-04-06
Payer: MEDICARE

## 2024-04-06 ENCOUNTER — APPOINTMENT (OUTPATIENT)
Age: 44
DRG: 917 | End: 2024-04-06
Payer: MEDICARE

## 2024-04-06 PROBLEM — R68.89 EPISODES OF DECREASED ATTENTIVENESS: Status: ACTIVE | Noted: 2024-04-06

## 2024-04-06 LAB
ALLEN TEST: POSITIVE
ANION GAP SERPL CALCULATED.3IONS-SCNC: 11 MMOL/L (ref 9–16)
ANION GAP SERPL CALCULATED.3IONS-SCNC: 9 MMOL/L (ref 9–16)
BASOPHILS # BLD: 0.04 K/UL (ref 0–0.2)
BASOPHILS NFR BLD: 0 % (ref 0–2)
BUN SERPL-MCNC: 3 MG/DL (ref 6–20)
BUN SERPL-MCNC: 4 MG/DL (ref 6–20)
CALCIUM SERPL-MCNC: 8.3 MG/DL (ref 8.6–10.4)
CALCIUM SERPL-MCNC: 8.4 MG/DL (ref 8.6–10.4)
CHLORIDE SERPL-SCNC: 104 MMOL/L (ref 98–107)
CHLORIDE SERPL-SCNC: 104 MMOL/L (ref 98–107)
CO2 SERPL-SCNC: 24 MMOL/L (ref 20–31)
CO2 SERPL-SCNC: 26 MMOL/L (ref 20–31)
CREAT SERPL-MCNC: 0.4 MG/DL (ref 0.5–0.9)
CREAT SERPL-MCNC: 0.5 MG/DL (ref 0.5–0.9)
ECHO AO ROOT DIAM: 2.7 CM
ECHO AO ROOT INDEX: 1.97 CM/M2
ECHO AV AREA PEAK VELOCITY: 2.2 CM2
ECHO AV AREA VTI: 2.4 CM2
ECHO AV AREA/BSA PEAK VELOCITY: 1.6 CM2/M2
ECHO AV AREA/BSA VTI: 1.8 CM2/M2
ECHO AV MEAN GRADIENT: 6 MMHG
ECHO AV MEAN VELOCITY: 1.1 M/S
ECHO AV PEAK GRADIENT: 12 MMHG
ECHO AV PEAK VELOCITY: 1.7 M/S
ECHO AV VELOCITY RATIO: 0.76
ECHO AV VTI: 29.8 CM
ECHO BSA: 1.36 M2
ECHO EST RA PRESSURE: 3 MMHG
ECHO LA AREA 2C: 12.4 CM2
ECHO LA AREA 4C: 12.2 CM2
ECHO LA DIAMETER INDEX: 2.26 CM/M2
ECHO LA DIAMETER: 3.1 CM
ECHO LA MAJOR AXIS: 4.7 CM
ECHO LA MINOR AXIS: 4 CM
ECHO LA TO AORTIC ROOT RATIO: 1.15
ECHO LA VOL BP: 29 ML (ref 22–52)
ECHO LA VOL MOD A2C: 32 ML (ref 22–52)
ECHO LA VOL MOD A4C: 22 ML (ref 22–52)
ECHO LA VOL/BSA BIPLANE: 21 ML/M2 (ref 16–34)
ECHO LA VOLUME INDEX MOD A2C: 23 ML/M2 (ref 16–34)
ECHO LA VOLUME INDEX MOD A4C: 16 ML/M2 (ref 16–34)
ECHO LV E' LATERAL VELOCITY: 18 CM/S
ECHO LV E' SEPTAL VELOCITY: 14 CM/S
ECHO LV EDV A2C: 52 ML
ECHO LV EDV A4C: 47 ML
ECHO LV EDV INDEX A4C: 34 ML/M2
ECHO LV EDV NDEX A2C: 38 ML/M2
ECHO LV EJECTION FRACTION A2C: 55 %
ECHO LV EJECTION FRACTION A4C: 50 %
ECHO LV ESV A2C: 23 ML
ECHO LV ESV A4C: 24 ML
ECHO LV ESV INDEX A2C: 17 ML/M2
ECHO LV ESV INDEX A4C: 18 ML/M2
ECHO LV FRACTIONAL SHORTENING: 13 % (ref 28–44)
ECHO LV INTERNAL DIMENSION DIASTOLE INDEX: 2.85 CM/M2
ECHO LV INTERNAL DIMENSION DIASTOLIC: 3.9 CM (ref 3.9–5.3)
ECHO LV INTERNAL DIMENSION SYSTOLIC INDEX: 2.48 CM/M2
ECHO LV INTERNAL DIMENSION SYSTOLIC: 3.4 CM
ECHO LV IVSD: 0.9 CM (ref 0.6–0.9)
ECHO LV MASS 2D: 105.3 G (ref 67–162)
ECHO LV MASS INDEX 2D: 76.9 G/M2 (ref 43–95)
ECHO LV POSTERIOR WALL DIASTOLIC: 0.9 CM (ref 0.6–0.9)
ECHO LV RELATIVE WALL THICKNESS RATIO: 0.46
ECHO LVOT AREA: 2.8 CM2
ECHO LVOT AV VTI INDEX: 0.84
ECHO LVOT DIAM: 1.9 CM
ECHO LVOT MEAN GRADIENT: 4 MMHG
ECHO LVOT PEAK GRADIENT: 7 MMHG
ECHO LVOT PEAK VELOCITY: 1.3 M/S
ECHO LVOT STROKE VOLUME INDEX: 51.5 ML/M2
ECHO LVOT SV: 70.6 ML
ECHO LVOT VTI: 24.9 CM
ECHO MV A VELOCITY: 0.79 M/S
ECHO MV AREA VTI: 3.3 CM2
ECHO MV E DECELERATION TIME (DT): 140 MS
ECHO MV E VELOCITY: 0.96 M/S
ECHO MV E/A RATIO: 1.22
ECHO MV E/E' LATERAL: 5.33
ECHO MV E/E' RATIO (AVERAGED): 6.1
ECHO MV LVOT VTI INDEX: 0.86
ECHO MV MAX VELOCITY: 1.1 M/S
ECHO MV MEAN GRADIENT: 2 MMHG
ECHO MV MEAN VELOCITY: 0.7 M/S
ECHO MV PEAK GRADIENT: 5 MMHG
ECHO MV VTI: 21.3 CM
ECHO PV MAX VELOCITY: 1 M/S
ECHO PV PEAK GRADIENT: 4 MMHG
ECHO RIGHT VENTRICULAR SYSTOLIC PRESSURE (RVSP): 42 MMHG
ECHO RV BASAL DIMENSION: 3.2 CM
ECHO RV FREE WALL PEAK S': 15 CM/S
ECHO RV TAPSE: 2.3 CM (ref 1.7–?)
ECHO TV REGURGITANT MAX VELOCITY: 3.13 M/S
ECHO TV REGURGITANT PEAK GRADIENT: 39 MMHG
EOSINOPHIL # BLD: 0.08 K/UL (ref 0–0.44)
EOSINOPHILS RELATIVE PERCENT: 1 % (ref 1–4)
ERYTHROCYTE [DISTWIDTH] IN BLOOD BY AUTOMATED COUNT: 12 % (ref 11.8–14.4)
FIO2: 30
GFR SERPL CREATININE-BSD FRML MDRD: >90 ML/MIN/1.73M2
GFR SERPL CREATININE-BSD FRML MDRD: >90 ML/MIN/1.73M2
GLUCOSE BLD-MCNC: 134 MG/DL (ref 74–100)
GLUCOSE SERPL-MCNC: 120 MG/DL (ref 74–99)
GLUCOSE SERPL-MCNC: 127 MG/DL (ref 74–99)
HCT VFR BLD AUTO: 34.3 % (ref 36.3–47.1)
HGB BLD-MCNC: 10.6 G/DL (ref 11.9–15.1)
IMM GRANULOCYTES # BLD AUTO: 0.07 K/UL (ref 0–0.3)
IMM GRANULOCYTES NFR BLD: 1 %
LACTIC ACID, WHOLE BLOOD: 1.1 MMOL/L (ref 0.7–2.1)
LACTIC ACID, WHOLE BLOOD: 1.2 MMOL/L (ref 0.7–2.1)
LACTIC ACID, WHOLE BLOOD: 1.8 MMOL/L (ref 0.7–2.1)
LYMPHOCYTES NFR BLD: 1.05 K/UL (ref 1.1–3.7)
LYMPHOCYTES RELATIVE PERCENT: 9 % (ref 24–43)
MAGNESIUM SERPL-MCNC: 1.7 MG/DL (ref 1.6–2.6)
MAGNESIUM SERPL-MCNC: 1.8 MG/DL (ref 1.6–2.6)
MCH RBC QN AUTO: 32.8 PG (ref 25.2–33.5)
MCHC RBC AUTO-ENTMCNC: 30.9 G/DL (ref 28.4–34.8)
MCV RBC AUTO: 106.2 FL (ref 82.6–102.9)
MONOCYTES NFR BLD: 0.99 K/UL (ref 0.1–1.2)
MONOCYTES NFR BLD: 9 % (ref 3–12)
NEUTROPHILS NFR BLD: 80 % (ref 36–65)
NEUTS SEG NFR BLD: 9.2 K/UL (ref 1.5–8.1)
NRBC BLD-RTO: 0 PER 100 WBC
O2 DELIVERY DEVICE: ABNORMAL
OSMOLALITY SERPL: 285 MOSM/KG (ref 275–295)
OSMOLALITY UR: 318 MOSM/KG (ref 80–1300)
PATIENT TEMP: 37.9
PLATELET # BLD AUTO: 175 K/UL (ref 138–453)
PMV BLD AUTO: 8.9 FL (ref 8.1–13.5)
POC HCO3: 27.3 MMOL/L (ref 21–28)
POC O2 SATURATION: 99.4 % (ref 94–98)
POC PCO2 TEMP: 41.5 MM HG
POC PCO2: 39.9 MM HG (ref 35–48)
POC PH TEMP: 7.43
POC PH: 7.44 (ref 7.35–7.45)
POC PO2 TEMP: 155.8 MM HG
POC PO2: 150.3 MM HG (ref 83–108)
POSITIVE BASE EXCESS, ART: 2.9 MMOL/L (ref 0–3)
POTASSIUM SERPL-SCNC: 3.1 MMOL/L (ref 3.7–5.3)
POTASSIUM SERPL-SCNC: 3.2 MMOL/L (ref 3.7–5.3)
RBC # BLD AUTO: 3.23 M/UL (ref 3.95–5.11)
RBC # BLD: ABNORMAL 10*6/UL
SAMPLE SITE: ABNORMAL
SODIUM SERPL-SCNC: 139 MMOL/L (ref 136–145)
SODIUM SERPL-SCNC: 139 MMOL/L (ref 136–145)
SODIUM UR-SCNC: 121 MMOL/L
WBC OTHER # BLD: 11.4 K/UL (ref 3.5–11.3)

## 2024-04-06 PROCEDURE — 2580000003 HC RX 258

## 2024-04-06 PROCEDURE — 74018 RADEX ABDOMEN 1 VIEW: CPT

## 2024-04-06 PROCEDURE — 6370000000 HC RX 637 (ALT 250 FOR IP)

## 2024-04-06 PROCEDURE — 36415 COLL VENOUS BLD VENIPUNCTURE: CPT

## 2024-04-06 PROCEDURE — 82803 BLOOD GASES ANY COMBINATION: CPT

## 2024-04-06 PROCEDURE — 93306 TTE W/DOPPLER COMPLETE: CPT

## 2024-04-06 PROCEDURE — 84300 ASSAY OF URINE SODIUM: CPT

## 2024-04-06 PROCEDURE — 2700000000 HC OXYGEN THERAPY PER DAY

## 2024-04-06 PROCEDURE — 94003 VENT MGMT INPAT SUBQ DAY: CPT

## 2024-04-06 PROCEDURE — 80048 BASIC METABOLIC PNL TOTAL CA: CPT

## 2024-04-06 PROCEDURE — 2500000003 HC RX 250 WO HCPCS

## 2024-04-06 PROCEDURE — 2000000000 HC ICU R&B

## 2024-04-06 PROCEDURE — 83735 ASSAY OF MAGNESIUM: CPT

## 2024-04-06 PROCEDURE — 99291 CRITICAL CARE FIRST HOUR: CPT | Performed by: INTERNAL MEDICINE

## 2024-04-06 PROCEDURE — 94761 N-INVAS EAR/PLS OXIMETRY MLT: CPT

## 2024-04-06 PROCEDURE — 6360000002 HC RX W HCPCS

## 2024-04-06 PROCEDURE — 85025 COMPLETE CBC W/AUTO DIFF WBC: CPT

## 2024-04-06 PROCEDURE — 83930 ASSAY OF BLOOD OSMOLALITY: CPT

## 2024-04-06 PROCEDURE — 83935 ASSAY OF URINE OSMOLALITY: CPT

## 2024-04-06 PROCEDURE — 36600 WITHDRAWAL OF ARTERIAL BLOOD: CPT

## 2024-04-06 PROCEDURE — 83605 ASSAY OF LACTIC ACID: CPT

## 2024-04-06 PROCEDURE — 82947 ASSAY GLUCOSE BLOOD QUANT: CPT

## 2024-04-06 PROCEDURE — 89220 SPUTUM SPECIMEN COLLECTION: CPT

## 2024-04-06 RX ADMIN — FENTANYL CITRATE 25 MCG: 50 INJECTION, SOLUTION INTRAMUSCULAR; INTRAVENOUS at 04:15

## 2024-04-06 RX ADMIN — SODIUM CHLORIDE 3000 MG: 900 INJECTION INTRAVENOUS at 09:36

## 2024-04-06 RX ADMIN — Medication 5 MG/HR: at 15:31

## 2024-04-06 RX ADMIN — FENTANYL CITRATE 25 MCG: 50 INJECTION, SOLUTION INTRAMUSCULAR; INTRAVENOUS at 00:04

## 2024-04-06 RX ADMIN — ENOXAPARIN SODIUM 40 MG: 100 INJECTION SUBCUTANEOUS at 09:34

## 2024-04-06 RX ADMIN — SODIUM CHLORIDE, POTASSIUM CHLORIDE, SODIUM LACTATE AND CALCIUM CHLORIDE: 600; 310; 30; 20 INJECTION, SOLUTION INTRAVENOUS at 05:34

## 2024-04-06 RX ADMIN — POTASSIUM BICARBONATE 40 MEQ: 782 TABLET, EFFERVESCENT ORAL at 06:55

## 2024-04-06 RX ADMIN — SODIUM CHLORIDE 3000 MG: 900 INJECTION INTRAVENOUS at 22:44

## 2024-04-06 RX ADMIN — Medication 150 MCG/HR: at 00:59

## 2024-04-06 RX ADMIN — ACETAMINOPHEN 325MG 650 MG: 325 TABLET ORAL at 22:44

## 2024-04-06 RX ADMIN — SODIUM CHLORIDE 3000 MG: 900 INJECTION INTRAVENOUS at 15:37

## 2024-04-06 RX ADMIN — POTASSIUM BICARBONATE 40 MEQ: 782 TABLET, EFFERVESCENT ORAL at 16:21

## 2024-04-06 RX ADMIN — SODIUM CHLORIDE 3000 MG: 900 INJECTION INTRAVENOUS at 04:13

## 2024-04-06 RX ADMIN — SODIUM CHLORIDE, PRESERVATIVE FREE 10 ML: 5 INJECTION INTRAVENOUS at 20:40

## 2024-04-06 RX ADMIN — POTASSIUM BICARBONATE 20 MEQ: 782 TABLET, EFFERVESCENT ORAL at 12:50

## 2024-04-06 RX ADMIN — Medication 175 MCG/HR: at 15:33

## 2024-04-06 RX ADMIN — ACETAMINOPHEN 325MG 650 MG: 325 TABLET ORAL at 16:21

## 2024-04-06 RX ADMIN — SODIUM CHLORIDE, POTASSIUM CHLORIDE, SODIUM LACTATE AND CALCIUM CHLORIDE: 600; 310; 30; 20 INJECTION, SOLUTION INTRAVENOUS at 15:30

## 2024-04-06 RX ADMIN — ACETAMINOPHEN 325MG 650 MG: 325 TABLET ORAL at 09:39

## 2024-04-06 RX ADMIN — SODIUM CHLORIDE, PRESERVATIVE FREE 10 ML: 5 INJECTION INTRAVENOUS at 09:35

## 2024-04-06 ASSESSMENT — PULMONARY FUNCTION TESTS
PIF_VALUE: 13
PIF_VALUE: 12
PIF_VALUE: 12
PIF_VALUE: 13
PIF_VALUE: 5
PIF_VALUE: 13

## 2024-04-06 NOTE — CARE COORDINATION
Unable to do initial assessment. No family at bedside. No emergency contacts. I/S possible overdose on home medications

## 2024-04-06 NOTE — CARE COORDINATION
Writer attempted psychiatric evaluation this morning. Patient is currently intubated/sedated. No family present or emergency contacts listed. Will re-attempt assessment tomorrow.

## 2024-04-07 LAB
ALBUMIN SERPL-MCNC: 3.3 G/DL (ref 3.5–5.2)
ALBUMIN/GLOB SERPL: 2 {RATIO} (ref 1–2.5)
ALP SERPL-CCNC: 53 U/L (ref 35–104)
ALT SERPL-CCNC: 8 U/L (ref 10–35)
ANION GAP SERPL CALCULATED.3IONS-SCNC: 8 MMOL/L (ref 9–16)
AST SERPL-CCNC: 23 U/L (ref 10–35)
BASOPHILS # BLD: 0.03 K/UL (ref 0–0.2)
BASOPHILS NFR BLD: 0 % (ref 0–2)
BILIRUB DIRECT SERPL-MCNC: <0.2 MG/DL (ref 0–0.3)
BILIRUB INDIRECT SERPL-MCNC: 0.2 MG/DL (ref 0–1)
BILIRUB SERPL-MCNC: 0.3 MG/DL (ref 0–1.2)
BUN SERPL-MCNC: 7 MG/DL (ref 6–20)
CALCIUM SERPL-MCNC: 8.2 MG/DL (ref 8.6–10.4)
CHLORIDE SERPL-SCNC: 105 MMOL/L (ref 98–107)
CO2 SERPL-SCNC: 21 MMOL/L (ref 20–31)
CREAT SERPL-MCNC: 0.4 MG/DL (ref 0.5–0.9)
EOSINOPHIL # BLD: 0.31 K/UL (ref 0–0.44)
EOSINOPHILS RELATIVE PERCENT: 3 % (ref 1–4)
ERYTHROCYTE [DISTWIDTH] IN BLOOD BY AUTOMATED COUNT: 12 % (ref 11.8–14.4)
FIO2: 30
GFR SERPL CREATININE-BSD FRML MDRD: >90 ML/MIN/1.73M2
GLOBULIN SER CALC-MCNC: 2 G/DL
GLUCOSE BLD-MCNC: 122 MG/DL (ref 74–100)
GLUCOSE SERPL-MCNC: 112 MG/DL (ref 74–99)
HCT VFR BLD AUTO: 32.9 % (ref 36.3–47.1)
HGB BLD-MCNC: 10.5 G/DL (ref 11.9–15.1)
IMM GRANULOCYTES # BLD AUTO: 0.04 K/UL (ref 0–0.3)
IMM GRANULOCYTES NFR BLD: 0 %
LYMPHOCYTES NFR BLD: 1.04 K/UL (ref 1.1–3.7)
LYMPHOCYTES RELATIVE PERCENT: 11 % (ref 24–43)
MAGNESIUM SERPL-MCNC: 2.1 MG/DL (ref 1.6–2.6)
MCH RBC QN AUTO: 32.7 PG (ref 25.2–33.5)
MCHC RBC AUTO-ENTMCNC: 31.9 G/DL (ref 28.4–34.8)
MCV RBC AUTO: 102.5 FL (ref 82.6–102.9)
MODE: ABNORMAL
MONOCYTES NFR BLD: 0.9 K/UL (ref 0.1–1.2)
MONOCYTES NFR BLD: 9 % (ref 3–12)
NEGATIVE BASE EXCESS, ART: 0.1 MMOL/L (ref 0–2)
NEUTROPHILS NFR BLD: 77 % (ref 36–65)
NEUTS SEG NFR BLD: 7.27 K/UL (ref 1.5–8.1)
NRBC BLD-RTO: 0 PER 100 WBC
O2 DELIVERY DEVICE: ABNORMAL
PATIENT TEMP: 37.6
PLATELET # BLD AUTO: 174 K/UL (ref 138–453)
PMV BLD AUTO: 8.9 FL (ref 8.1–13.5)
POC HCO3: 24.5 MMOL/L (ref 21–28)
POC O2 SATURATION: 99.3 % (ref 94–98)
POC PCO2 TEMP: 39.5 MM HG
POC PCO2: 38.5 MM HG (ref 35–48)
POC PH TEMP: 7.4
POC PH: 7.41 (ref 7.35–7.45)
POC PO2 TEMP: 147.6 MM HG
POC PO2: 143.9 MM HG (ref 83–108)
POTASSIUM SERPL-SCNC: 3.4 MMOL/L (ref 3.7–5.3)
PROT SERPL-MCNC: 5.3 G/DL (ref 6.6–8.7)
RBC # BLD AUTO: 3.21 M/UL (ref 3.95–5.11)
SAMPLE SITE: ABNORMAL
SODIUM SERPL-SCNC: 134 MMOL/L (ref 136–145)
WBC OTHER # BLD: 9.6 K/UL (ref 3.5–11.3)

## 2024-04-07 PROCEDURE — 99223 1ST HOSP IP/OBS HIGH 75: CPT

## 2024-04-07 PROCEDURE — 6370000000 HC RX 637 (ALT 250 FOR IP)

## 2024-04-07 PROCEDURE — 80048 BASIC METABOLIC PNL TOTAL CA: CPT

## 2024-04-07 PROCEDURE — 94761 N-INVAS EAR/PLS OXIMETRY MLT: CPT

## 2024-04-07 PROCEDURE — 82803 BLOOD GASES ANY COMBINATION: CPT

## 2024-04-07 PROCEDURE — 85025 COMPLETE CBC W/AUTO DIFF WBC: CPT

## 2024-04-07 PROCEDURE — 2580000003 HC RX 258

## 2024-04-07 PROCEDURE — 2500000003 HC RX 250 WO HCPCS

## 2024-04-07 PROCEDURE — 95816 EEG AWAKE AND DROWSY: CPT | Performed by: PSYCHIATRY & NEUROLOGY

## 2024-04-07 PROCEDURE — 2000000000 HC ICU R&B

## 2024-04-07 PROCEDURE — 87040 BLOOD CULTURE FOR BACTERIA: CPT

## 2024-04-07 PROCEDURE — 95816 EEG AWAKE AND DROWSY: CPT

## 2024-04-07 PROCEDURE — APPSS30 APP SPLIT SHARED TIME 16-30 MINUTES: Performed by: NURSE PRACTITIONER

## 2024-04-07 PROCEDURE — 82947 ASSAY GLUCOSE BLOOD QUANT: CPT

## 2024-04-07 PROCEDURE — 6360000002 HC RX W HCPCS

## 2024-04-07 PROCEDURE — 2700000000 HC OXYGEN THERAPY PER DAY

## 2024-04-07 PROCEDURE — 83735 ASSAY OF MAGNESIUM: CPT

## 2024-04-07 PROCEDURE — 36415 COLL VENOUS BLD VENIPUNCTURE: CPT

## 2024-04-07 PROCEDURE — 36600 WITHDRAWAL OF ARTERIAL BLOOD: CPT

## 2024-04-07 PROCEDURE — 94003 VENT MGMT INPAT SUBQ DAY: CPT

## 2024-04-07 PROCEDURE — 99291 CRITICAL CARE FIRST HOUR: CPT | Performed by: INTERNAL MEDICINE

## 2024-04-07 PROCEDURE — 80076 HEPATIC FUNCTION PANEL: CPT

## 2024-04-07 RX ORDER — POTASSIUM CHLORIDE 20 MEQ/1
40 TABLET, EXTENDED RELEASE ORAL PRN
Status: DISCONTINUED | OUTPATIENT
Start: 2024-04-07 | End: 2024-04-09 | Stop reason: HOSPADM

## 2024-04-07 RX ORDER — LITHIUM CARBONATE 300 MG
300 TABLET ORAL 3 TIMES DAILY
Status: DISCONTINUED | OUTPATIENT
Start: 2024-04-07 | End: 2024-04-09 | Stop reason: HOSPADM

## 2024-04-07 RX ORDER — LITHIUM CARBONATE 300 MG/1
300 TABLET, FILM COATED, EXTENDED RELEASE ORAL EVERY 8 HOURS
Status: DISCONTINUED | OUTPATIENT
Start: 2024-04-07 | End: 2024-04-07

## 2024-04-07 RX ORDER — DEXMEDETOMIDINE HYDROCHLORIDE 4 UG/ML
.1-1.5 INJECTION, SOLUTION INTRAVENOUS CONTINUOUS
Status: DISCONTINUED | OUTPATIENT
Start: 2024-04-07 | End: 2024-04-08

## 2024-04-07 RX ORDER — LITHIUM CARBONATE 300 MG
300 TABLET ORAL 3 TIMES DAILY
Status: DISCONTINUED | OUTPATIENT
Start: 2024-04-07 | End: 2024-04-07

## 2024-04-07 RX ADMIN — SODIUM CHLORIDE 3000 MG: 900 INJECTION INTRAVENOUS at 21:47

## 2024-04-07 RX ADMIN — ACETAMINOPHEN 325MG 650 MG: 325 TABLET ORAL at 08:31

## 2024-04-07 RX ADMIN — SODIUM CHLORIDE 3000 MG: 900 INJECTION INTRAVENOUS at 16:46

## 2024-04-07 RX ADMIN — SODIUM CHLORIDE, PRESERVATIVE FREE 10 ML: 5 INJECTION INTRAVENOUS at 08:24

## 2024-04-07 RX ADMIN — LITHIUM CARBONATE 300 MG: 300 TABLET ORAL at 21:05

## 2024-04-07 RX ADMIN — FENTANYL CITRATE 25 MCG: 50 INJECTION, SOLUTION INTRAMUSCULAR; INTRAVENOUS at 21:11

## 2024-04-07 RX ADMIN — DEXMEDETOMIDINE HYDROCHLORIDE 1.4 MCG/KG/HR: 400 INJECTION, SOLUTION INTRAVENOUS at 21:45

## 2024-04-07 RX ADMIN — Medication 150 MCG/HR: at 08:26

## 2024-04-07 RX ADMIN — LITHIUM CARBONATE 300 MG: 300 TABLET ORAL at 14:12

## 2024-04-07 RX ADMIN — SODIUM CHLORIDE, POTASSIUM CHLORIDE, SODIUM LACTATE AND CALCIUM CHLORIDE: 600; 310; 30; 20 INJECTION, SOLUTION INTRAVENOUS at 08:33

## 2024-04-07 RX ADMIN — SODIUM CHLORIDE 3000 MG: 900 INJECTION INTRAVENOUS at 04:16

## 2024-04-07 RX ADMIN — DEXMEDETOMIDINE HYDROCHLORIDE 0.2 MCG/KG/HR: 400 INJECTION, SOLUTION INTRAVENOUS at 12:38

## 2024-04-07 RX ADMIN — POTASSIUM BICARBONATE 40 MEQ: 782 TABLET, EFFERVESCENT ORAL at 05:31

## 2024-04-07 RX ADMIN — ENOXAPARIN SODIUM 40 MG: 100 INJECTION SUBCUTANEOUS at 08:24

## 2024-04-07 RX ADMIN — SODIUM CHLORIDE 3000 MG: 900 INJECTION INTRAVENOUS at 10:20

## 2024-04-07 RX ADMIN — SODIUM CHLORIDE, PRESERVATIVE FREE 10 ML: 5 INJECTION INTRAVENOUS at 21:08

## 2024-04-07 ASSESSMENT — PULMONARY FUNCTION TESTS
PIF_VALUE: 13
PIF_VALUE: 7
PIF_VALUE: 13
PIF_VALUE: 11
PIF_VALUE: 13
PIF_VALUE: 9

## 2024-04-07 NOTE — FLOWSHEET NOTE
Patient's brother (Vincent Mahoney - [phone]) calls unit.  Update given on patient status by RN & Dr Singleton (critical care resident).  Per Vincent, he and their two half siblings are the only family that Rabia has.  He will be contacting them this evening.  If patient is to decline, Vincent requested that he be contacted via the [place] as he is currently actively deployed. States that he will be calling tomorrow during the day for an update.

## 2024-04-07 NOTE — CONSULTS
[facility]   IN-PATIENT SERVICE      NEUROLOGY CONSULT  NOTE            Date:   4/6/2024  Patient name:  Rabia Rivera  Date of admission:  4/4/2024  YOB: 1980      Chief Complaint:     Chief Complaint   Patient presents with    Altered Mental Status     Sltered mental status unknown cause, alert but not answering questions       Reason for Consult:      Encephalopathy    History of Present Illness:     The patient is a 43 y.o. female who presents with Altered Mental Status (Sltered mental status unknown cause, alert but not answering questions)  . The patient was seen and examined and the chart was reviewed.  Patient was a family member here in the hospital visiting her significant other who is currently admitted.  She was found in the room after having episodes of emesis followed by being found minimally responsive in the chair.  Rapid response was called eventually patient being taken down to the ED.  Due to airway protection she was intubated.  There was concern patient took multiple of her home medications including baclofen, lithium, trazodone although this is not confirmed.  She did receive activated charcoal in the ED.  Patient was admitted to medical ICU.  This morning she had a brief sedation holiday of about 30 minutes in which she was noted to be moving all 4 extremities, moving her head back-and-forth but not following commands.  She had a CT head done in the ED which did not reveal any acute abnormalities.  No other significant medical abnormalities have been found so far.    Past Medical History:     No past medical history on file.     Past Surgical History:     No past surgical history on file.     Medications Prior to Admission:     Prior to Admission medications    Medication Sig Start Date End Date Taking? Authorizing Provider   acyclovir (ZOVIRAX) 400 MG tablet Take 1 tablet by mouth 2 times daily   Yes Provider, MD sanjiv Pazbital-APAP-caffeine (FIORICET) 
Comprehensive Nutrition Assessment    Type and Reason for Visit:  Initial, Consult (Tube Feeding Order and Management)    Nutrition Recommendations/Plan:   Start TF using [employer] Peptide 1.5 formula at 10 mL/hr.  Suggest slow advancement to goal 30 mL/hr with current rate of propofol.  Will provide 1080 kcals (+propofol kcals), 53 gm protein per day.  If propofol discontinued, suggest running at goal rate of 35 mL/hr.  Monitor TF tolerance, propofol rate, labs, weights, and plan of care.     Malnutrition Assessment:  Malnutrition Status:  Insufficient data (04/05/24 1222)    Context:  Acute Illness     Findings of the 6 clinical characteristics of malnutrition:  Energy Intake:  75% or less of estimated energy requirements for 7 or more days  Weight Loss:  Unable to assess     Body Fat Loss:  No significant body fat loss Orbital   Muscle Mass Loss:  Unable to assess  Fluid Accumulation:  No significant fluid accumulation   Strength:  Not Performed    Nutrition Assessment:    Consulted for Tube Feedings Order and Management.  Pt admitted with AMS.  Currently intubated and sedated.  Propofol running at 11.6 mL/hr at visit.  RN reports pt with h/o celiac disease and lactose intolerance.  Discussed starting tube feedings using [employer] products.  Per chart review, reports of pt having vomiting and eating poorly before admission. Labs reviewed: K 3.5 mmol/L, Glucose 102-105 mg/dL.  Meds reviewed.    Nutrition Related Findings:    Labs/Meds reviewed. Wound Type: None       Current Nutrition Intake & Therapies:    Average Meal Intake: NPO  Average Supplements Intake: NPO  Diet NPO  ADULT TUBE FEEDING; Nasogastric; Standard with Fiber; Continuous; 10; Yes; 10; Q 4 hours; 50; 30; Q 4 hours  Additional Calorie Sources:  Propofol at 11.6 mL/hr = 306 kcals/day.    Anthropometric Measures:  Height: 152.4 cm (5')  Ideal Body Weight (IBW): 100 lbs (45 kg)    Admission Body Weight: 44.4 kg (97 lb 14.2 oz)  Current Body 
nodding  Appearance: hospital attire, lying in bed, poor grooming  Behavior/Motor: Restless  Attitude toward examiner: Attempts to cooperate, semiattentive, poor eye contact   Speech: Nonverbal due to intubated, communicates by head nod.    Mood: Unable to assess  Affect: Flat  Thought processes:  Linear, coherent  Thought content: Denies suicidal ideations   Denies homicidal ideations    Denies hallucinations   Denies delusions  Cognition:  Oriented to self, unable to thoroughly assess due to inability to communicate  Concentration: Sustained  Memory: Unable to assess  Insight & Judgment: Unable to    DSM-5 DIAGNOSIS:      Alterative mental status  Encephalopathy  Acute respiratory failure with hypoxia  Cannabis use    Stressors     Severity of stressors is moderate to severe  Source of stressors include:  Other psychosocial and environmental stressors    Plan:      At this time unsure if patient to have intentionally overdosed.  Plan to further assess patient when extubated to determine if patient would benefit from inpatient psychiatric hospitalization.  No Medication Changes Today  Additional recommendations will follow the clinical course.   Agreeable to restart lithium 300 mg p.o. 3 times daily  At this time patient does not appear at immediate danger for self thus does not warrant a one-to-one sitter.    Thank you very much for allowing us to participate in the care of this patient.      Electronically signed by ROBERT Richter CNP on 4/7/24 at 2:32 PM EDT    Please note that this chart was generated using voice recognition Dragon dictation software.  Although every effort was made to ensure the accuracy of this automated transcription, some errors in transcription may have occurred.

## 2024-04-08 ENCOUNTER — APPOINTMENT (OUTPATIENT)
Dept: GENERAL RADIOLOGY | Age: 44
DRG: 917 | End: 2024-04-08
Payer: MEDICARE

## 2024-04-08 PROBLEM — R45.851 DEPRESSION WITH SUICIDAL IDEATION: Status: ACTIVE | Noted: 2024-04-08

## 2024-04-08 PROBLEM — F32.A DEPRESSION WITH SUICIDAL IDEATION: Status: ACTIVE | Noted: 2024-04-08

## 2024-04-08 LAB
ALBUMIN SERPL-MCNC: 3.5 G/DL (ref 3.5–5.2)
ALBUMIN/GLOB SERPL: 1 {RATIO} (ref 1–2.5)
ALP SERPL-CCNC: 60 U/L (ref 35–104)
ALT SERPL-CCNC: 9 U/L (ref 10–35)
ANION GAP SERPL CALCULATED.3IONS-SCNC: 14 MMOL/L (ref 9–16)
ANION GAP SERPL CALCULATED.3IONS-SCNC: 9 MMOL/L (ref 9–16)
AST SERPL-CCNC: 20 U/L (ref 10–35)
BASOPHILS # BLD: <0.03 K/UL (ref 0–0.2)
BASOPHILS NFR BLD: 0 % (ref 0–2)
BILIRUB DIRECT SERPL-MCNC: <0.2 MG/DL (ref 0–0.3)
BILIRUB INDIRECT SERPL-MCNC: 0.1 MG/DL (ref 0–1)
BILIRUB SERPL-MCNC: 0.2 MG/DL (ref 0–1.2)
BUN SERPL-MCNC: 6 MG/DL (ref 6–20)
BUN SERPL-MCNC: 6 MG/DL (ref 6–20)
CA-I BLD-SCNC: 1.25 MMOL/L (ref 1.13–1.33)
CALCIUM SERPL-MCNC: 9 MG/DL (ref 8.6–10.4)
CALCIUM SERPL-MCNC: 9.8 MG/DL (ref 8.6–10.4)
CHLORIDE SERPL-SCNC: 106 MMOL/L (ref 98–107)
CHLORIDE SERPL-SCNC: 108 MMOL/L (ref 98–107)
CO2 SERPL-SCNC: 18 MMOL/L (ref 20–31)
CO2 SERPL-SCNC: 20 MMOL/L (ref 20–31)
CREAT SERPL-MCNC: 0.3 MG/DL (ref 0.5–0.9)
CREAT SERPL-MCNC: 0.5 MG/DL (ref 0.5–0.9)
EKG ATRIAL RATE: 64 BPM
EKG P AXIS: 75 DEGREES
EKG P-R INTERVAL: 134 MS
EKG Q-T INTERVAL: 406 MS
EKG QRS DURATION: 84 MS
EKG QTC CALCULATION (BAZETT): 418 MS
EKG R AXIS: 42 DEGREES
EKG T AXIS: 50 DEGREES
EKG VENTRICULAR RATE: 64 BPM
EOSINOPHIL # BLD: 0.34 K/UL (ref 0–0.44)
EOSINOPHILS RELATIVE PERCENT: 5 % (ref 1–4)
ERYTHROCYTE [DISTWIDTH] IN BLOOD BY AUTOMATED COUNT: 11.9 % (ref 11.8–14.4)
FIO2: 30
GFR SERPL CREATININE-BSD FRML MDRD: >90 ML/MIN/1.73M2
GFR SERPL CREATININE-BSD FRML MDRD: >90 ML/MIN/1.73M2
GLOBULIN SER CALC-MCNC: 2.5 G/DL
GLUCOSE BLD-MCNC: 117 MG/DL (ref 65–105)
GLUCOSE BLD-MCNC: 157 MG/DL (ref 74–100)
GLUCOSE SERPL-MCNC: 114 MG/DL (ref 74–99)
GLUCOSE SERPL-MCNC: 128 MG/DL (ref 74–99)
HCT VFR BLD AUTO: 33.2 % (ref 36.3–47.1)
HGB BLD-MCNC: 10.9 G/DL (ref 11.9–15.1)
IMM GRANULOCYTES # BLD AUTO: 0.03 K/UL (ref 0–0.3)
IMM GRANULOCYTES NFR BLD: 0 %
LYMPHOCYTES NFR BLD: 1.01 K/UL (ref 1.1–3.7)
LYMPHOCYTES RELATIVE PERCENT: 15 % (ref 24–43)
MAGNESIUM SERPL-MCNC: 2.1 MG/DL (ref 1.6–2.6)
MCH RBC QN AUTO: 32.8 PG (ref 25.2–33.5)
MCHC RBC AUTO-ENTMCNC: 32.8 G/DL (ref 28.4–34.8)
MCV RBC AUTO: 100 FL (ref 82.6–102.9)
MICROORGANISM SPEC CULT: ABNORMAL
MICROORGANISM SPEC CULT: ABNORMAL
MICROORGANISM/AGENT SPEC: ABNORMAL
MONOCYTES NFR BLD: 0.77 K/UL (ref 0.1–1.2)
MONOCYTES NFR BLD: 12 % (ref 3–12)
NEGATIVE BASE EXCESS, ART: 2.3 MMOL/L (ref 0–2)
NEUTROPHILS NFR BLD: 68 % (ref 36–65)
NEUTS SEG NFR BLD: 4.5 K/UL (ref 1.5–8.1)
NRBC BLD-RTO: 0 PER 100 WBC
PLATELET # BLD AUTO: 163 K/UL (ref 138–453)
PMV BLD AUTO: 8.8 FL (ref 8.1–13.5)
POC HCO3: 22.7 MMOL/L (ref 21–28)
POC O2 SATURATION: 97.4 % (ref 94–98)
POC PCO2: 39.2 MM HG (ref 35–48)
POC PH: 7.37 (ref 7.35–7.45)
POC PO2: 97.6 MM HG (ref 83–108)
POTASSIUM SERPL-SCNC: 3.9 MMOL/L (ref 3.7–5.3)
POTASSIUM SERPL-SCNC: 4.1 MMOL/L (ref 3.7–5.3)
PROT SERPL-MCNC: 6 G/DL (ref 6.6–8.7)
RBC # BLD AUTO: 3.32 M/UL (ref 3.95–5.11)
SODIUM SERPL-SCNC: 137 MMOL/L (ref 136–145)
SODIUM SERPL-SCNC: 138 MMOL/L (ref 136–145)
SPECIMEN DESCRIPTION: ABNORMAL
WBC OTHER # BLD: 6.7 K/UL (ref 3.5–11.3)

## 2024-04-08 PROCEDURE — 6360000002 HC RX W HCPCS

## 2024-04-08 PROCEDURE — 2580000003 HC RX 258

## 2024-04-08 PROCEDURE — 6370000000 HC RX 637 (ALT 250 FOR IP)

## 2024-04-08 PROCEDURE — 99232 SBSQ HOSP IP/OBS MODERATE 35: CPT | Performed by: NURSE PRACTITIONER

## 2024-04-08 PROCEDURE — 80076 HEPATIC FUNCTION PANEL: CPT

## 2024-04-08 PROCEDURE — 83735 ASSAY OF MAGNESIUM: CPT

## 2024-04-08 PROCEDURE — 85025 COMPLETE CBC W/AUTO DIFF WBC: CPT

## 2024-04-08 PROCEDURE — 2500000003 HC RX 250 WO HCPCS

## 2024-04-08 PROCEDURE — 36600 WITHDRAWAL OF ARTERIAL BLOOD: CPT

## 2024-04-08 PROCEDURE — 6370000000 HC RX 637 (ALT 250 FOR IP): Performed by: NURSE PRACTITIONER

## 2024-04-08 PROCEDURE — 94003 VENT MGMT INPAT SUBQ DAY: CPT

## 2024-04-08 PROCEDURE — 2060000000 HC ICU INTERMEDIATE R&B

## 2024-04-08 PROCEDURE — 80048 BASIC METABOLIC PNL TOTAL CA: CPT

## 2024-04-08 PROCEDURE — 93005 ELECTROCARDIOGRAM TRACING: CPT

## 2024-04-08 PROCEDURE — 82330 ASSAY OF CALCIUM: CPT

## 2024-04-08 PROCEDURE — 82803 BLOOD GASES ANY COMBINATION: CPT

## 2024-04-08 PROCEDURE — 36415 COLL VENOUS BLD VENIPUNCTURE: CPT

## 2024-04-08 PROCEDURE — 82947 ASSAY GLUCOSE BLOOD QUANT: CPT

## 2024-04-08 PROCEDURE — 99291 CRITICAL CARE FIRST HOUR: CPT | Performed by: INTERNAL MEDICINE

## 2024-04-08 PROCEDURE — 92610 EVALUATE SWALLOWING FUNCTION: CPT

## 2024-04-08 PROCEDURE — 71045 X-RAY EXAM CHEST 1 VIEW: CPT

## 2024-04-08 RX ORDER — OXYCODONE HYDROCHLORIDE 5 MG/1
5 TABLET ORAL ONCE
Status: COMPLETED | OUTPATIENT
Start: 2024-04-08 | End: 2024-04-08

## 2024-04-08 RX ORDER — FENTANYL CITRATE 50 UG/ML
25 INJECTION, SOLUTION INTRAMUSCULAR; INTRAVENOUS
Status: DISCONTINUED | OUTPATIENT
Start: 2024-04-08 | End: 2024-04-08

## 2024-04-08 RX ORDER — PANTOPRAZOLE SODIUM 40 MG/1
40 TABLET, DELAYED RELEASE ORAL
Status: DISCONTINUED | OUTPATIENT
Start: 2024-04-09 | End: 2024-04-09 | Stop reason: HOSPADM

## 2024-04-08 RX ORDER — HYDROXYZINE HYDROCHLORIDE 25 MG/1
50 TABLET, FILM COATED ORAL 3 TIMES DAILY PRN
Status: DISCONTINUED | OUTPATIENT
Start: 2024-04-08 | End: 2024-04-09 | Stop reason: HOSPADM

## 2024-04-08 RX ORDER — HYDROXYZINE HYDROCHLORIDE 10 MG/1
10 TABLET, FILM COATED ORAL ONCE
Status: COMPLETED | OUTPATIENT
Start: 2024-04-08 | End: 2024-04-08

## 2024-04-08 RX ORDER — METOCLOPRAMIDE HYDROCHLORIDE 5 MG/ML
10 INJECTION INTRAMUSCULAR; INTRAVENOUS EVERY 6 HOURS
Status: DISCONTINUED | OUTPATIENT
Start: 2024-04-08 | End: 2024-04-09 | Stop reason: HOSPADM

## 2024-04-08 RX ORDER — PROMETHAZINE HYDROCHLORIDE 25 MG/ML
12.5 INJECTION, SOLUTION INTRAMUSCULAR; INTRAVENOUS ONCE
Status: DISCONTINUED | OUTPATIENT
Start: 2024-04-08 | End: 2024-04-09 | Stop reason: HOSPADM

## 2024-04-08 RX ORDER — TRAZODONE HYDROCHLORIDE 50 MG/1
50 TABLET ORAL NIGHTLY PRN
Status: DISCONTINUED | OUTPATIENT
Start: 2024-04-08 | End: 2024-04-09 | Stop reason: HOSPADM

## 2024-04-08 RX ADMIN — ENOXAPARIN SODIUM 40 MG: 100 INJECTION SUBCUTANEOUS at 08:00

## 2024-04-08 RX ADMIN — TRAZODONE HYDROCHLORIDE 50 MG: 50 TABLET ORAL at 23:27

## 2024-04-08 RX ADMIN — SODIUM CHLORIDE, POTASSIUM CHLORIDE, SODIUM LACTATE AND CALCIUM CHLORIDE: 600; 310; 30; 20 INJECTION, SOLUTION INTRAVENOUS at 00:54

## 2024-04-08 RX ADMIN — OXYCODONE 5 MG: 5 TABLET ORAL at 21:07

## 2024-04-08 RX ADMIN — DEXMEDETOMIDINE HYDROCHLORIDE 1.5 MCG/KG/HR: 400 INJECTION, SOLUTION INTRAVENOUS at 09:12

## 2024-04-08 RX ADMIN — SODIUM CHLORIDE: 9 INJECTION, SOLUTION INTRAVENOUS at 01:03

## 2024-04-08 RX ADMIN — SODIUM CHLORIDE 3000 MG: 900 INJECTION INTRAVENOUS at 04:11

## 2024-04-08 RX ADMIN — LITHIUM CARBONATE 300 MG: 300 TABLET ORAL at 20:36

## 2024-04-08 RX ADMIN — Medication 150 MCG/HR: at 06:03

## 2024-04-08 RX ADMIN — MIDAZOLAM HYDROCHLORIDE 2 MG: 1 INJECTION, SOLUTION INTRAMUSCULAR; INTRAVENOUS at 00:10

## 2024-04-08 RX ADMIN — LITHIUM CARBONATE 300 MG: 300 TABLET ORAL at 14:11

## 2024-04-08 RX ADMIN — METOCLOPRAMIDE 10 MG: 5 INJECTION, SOLUTION INTRAMUSCULAR; INTRAVENOUS at 23:02

## 2024-04-08 RX ADMIN — MIDAZOLAM HYDROCHLORIDE 2 MG: 1 INJECTION, SOLUTION INTRAMUSCULAR; INTRAVENOUS at 06:40

## 2024-04-08 RX ADMIN — LITHIUM CARBONATE 300 MG: 300 TABLET ORAL at 08:02

## 2024-04-08 RX ADMIN — SODIUM CHLORIDE, PRESERVATIVE FREE 10 ML: 5 INJECTION INTRAVENOUS at 21:33

## 2024-04-08 RX ADMIN — Medication 1000 MG: at 21:33

## 2024-04-08 RX ADMIN — SODIUM CHLORIDE, PRESERVATIVE FREE 10 ML: 5 INJECTION INTRAVENOUS at 08:00

## 2024-04-08 RX ADMIN — HYDROXYZINE HYDROCHLORIDE 10 MG: 10 TABLET ORAL at 17:23

## 2024-04-08 RX ADMIN — DEXMEDETOMIDINE HYDROCHLORIDE 1.5 MCG/KG/HR: 400 INJECTION, SOLUTION INTRAVENOUS at 04:13

## 2024-04-08 RX ADMIN — FENTANYL CITRATE 25 MCG: 50 INJECTION INTRAMUSCULAR; INTRAVENOUS at 15:03

## 2024-04-08 RX ADMIN — METOCLOPRAMIDE 10 MG: 5 INJECTION, SOLUTION INTRAMUSCULAR; INTRAVENOUS at 17:52

## 2024-04-08 RX ADMIN — SODIUM CHLORIDE, POTASSIUM CHLORIDE, SODIUM LACTATE AND CALCIUM CHLORIDE: 600; 310; 30; 20 INJECTION, SOLUTION INTRAVENOUS at 17:56

## 2024-04-08 RX ADMIN — HYDROXYZINE HYDROCHLORIDE 50 MG: 25 TABLET, FILM COATED ORAL at 23:02

## 2024-04-08 RX ADMIN — FENTANYL CITRATE 25 MCG: 50 INJECTION INTRAMUSCULAR; INTRAVENOUS at 11:52

## 2024-04-08 RX ADMIN — FENTANYL CITRATE 25 MCG: 50 INJECTION, SOLUTION INTRAMUSCULAR; INTRAVENOUS at 06:12

## 2024-04-08 RX ADMIN — ONDANSETRON 4 MG: 2 INJECTION INTRAMUSCULAR; INTRAVENOUS at 15:50

## 2024-04-08 RX ADMIN — MIDAZOLAM HYDROCHLORIDE 2 MG: 1 INJECTION, SOLUTION INTRAMUSCULAR; INTRAVENOUS at 03:49

## 2024-04-08 ASSESSMENT — PULMONARY FUNCTION TESTS
PIF_VALUE: 13
PIF_VALUE: 11

## 2024-04-08 ASSESSMENT — PAIN DESCRIPTION - LOCATION
LOCATION: HEAD
LOCATION: CHEST
LOCATION: HEAD
LOCATION: CHEST
LOCATION: HEAD
LOCATION: ABDOMEN;CHEST;HEAD

## 2024-04-08 ASSESSMENT — PAIN SCALES - GENERAL
PAINLEVEL_OUTOF10: 7
PAINLEVEL_OUTOF10: 8
PAINLEVEL_OUTOF10: 8
PAINLEVEL_OUTOF10: 10
PAINLEVEL_OUTOF10: 8
PAINLEVEL_OUTOF10: 7
PAINLEVEL_OUTOF10: 8
PAINLEVEL_OUTOF10: 7

## 2024-04-08 ASSESSMENT — PAIN DESCRIPTION - ORIENTATION
ORIENTATION: ANTERIOR
ORIENTATION: ANTERIOR

## 2024-04-08 ASSESSMENT — PAIN DESCRIPTION - PAIN TYPE: TYPE: ACUTE PAIN

## 2024-04-08 ASSESSMENT — PAIN DESCRIPTION - DESCRIPTORS: DESCRIPTORS: ACHING

## 2024-04-08 NOTE — FLOWSHEET NOTE
1000: Order obtained for suicide precautions.  Sitter to bedside before extubation. Patient's belongings taped closed, trash bags and linen bag removed from room along with safe gown/shirt placed on patient.     1006: Patient extubated.    1155:Writer asking patient suicide screening questions.  Patient denies suicide and is short/brief with responses.  When asked about her previous admission to [facility], patient denies it being an intentional OD or suicide attempt.

## 2024-04-08 NOTE — PROCEDURES
Date: 4/7/2024  Referring physician:     Roc Daley, DO       Indication  Patient aged 43 y with encephalopathy. EEG done to assess for epileptiform activity.    Introduction  This routine 23-minute EEG was recorded using the International 10-20 System on a [email or web address] workstation at 256 samples/s. Automated spike and seizure detection algorithms were applied.    Description  The background consistent of diffuse none reactive polymorphic delta and theta slowing. No consistent focal slowing or interhemispheric asymmetry was noted. Stage I and stage II sleep were not observed. There were no interictal epileptiform discharges or electrographic seizures.    Activations  Hyperventilation was not performed. Intermittent photic stimulation was performed and demonstrated no posterior driving response.    Impression  Abnormal awake EEG. The slowing mentioned above suggests moderate non specific encephalopathy.     No epileptiform discharges were identified. Please note the absence of such activity on this record cannot conclusively rule out an epileptic disorder. If such is still clinically suspected, a repeat study with sleep deprivation and/or prolonged sampling may be helpful.    Ashwin Martínez MD  Epilepsy Board Certified.  Neurology Board Certified.    Electronically Signed

## 2024-04-08 NOTE — CARE COORDINATION
Attempted to meet with patient to complete initial assessment. She did not want to talk at this time

## 2024-04-08 NOTE — FLOWSHEET NOTE
Patient admitted to writer the reason for her admission was that \"she took a partial hand full of her significant other's Baclofen because she couldn't stand watching him die.\" She was unable to state how many pills she took.Dr. Hernandez with critical care made aware.

## 2024-04-09 ENCOUNTER — APPOINTMENT (OUTPATIENT)
Dept: GENERAL RADIOLOGY | Age: 44
DRG: 917 | End: 2024-04-09
Payer: MEDICARE

## 2024-04-09 ENCOUNTER — HOSPITAL ENCOUNTER (INPATIENT)
Age: 44
LOS: 3 days | Discharge: HOME | DRG: 885 | End: 2024-04-12
Attending: PSYCHIATRY & NEUROLOGY | Admitting: PSYCHIATRY & NEUROLOGY
Payer: MEDICARE

## 2024-04-09 VITALS
DIASTOLIC BLOOD PRESSURE: 82 MMHG | SYSTOLIC BLOOD PRESSURE: 116 MMHG | OXYGEN SATURATION: 100 % | HEIGHT: 60 IN | WEIGHT: 95.46 LBS | RESPIRATION RATE: 22 BRPM | TEMPERATURE: 98.5 F | BODY MASS INDEX: 18.74 KG/M2 | HEART RATE: 74 BPM

## 2024-04-09 PROBLEM — G43.909 MIGRAINES: Status: ACTIVE | Noted: 2024-04-09

## 2024-04-09 PROBLEM — M79.7 FIBROMYALGIA: Status: ACTIVE | Noted: 2024-04-09

## 2024-04-09 PROBLEM — T42.8X2A: Status: ACTIVE | Noted: 2024-04-09

## 2024-04-09 PROBLEM — E87.6 HYPOKALEMIA: Status: ACTIVE | Noted: 2024-04-09

## 2024-04-09 PROBLEM — M06.9 RHEUMATOID ARTHRITIS (HCC): Status: ACTIVE | Noted: 2024-04-09

## 2024-04-09 PROBLEM — D64.9 ANEMIA, NORMOCYTIC NORMOCHROMIC: Status: ACTIVE | Noted: 2024-04-09

## 2024-04-09 PROBLEM — F31.9 BIPOLAR 1 DISORDER, DEPRESSED (HCC): Status: ACTIVE | Noted: 2024-04-09

## 2024-04-09 LAB
ANION GAP SERPL CALCULATED.3IONS-SCNC: 11 MMOL/L (ref 9–16)
BASOPHILS # BLD: 0.03 K/UL (ref 0–0.2)
BASOPHILS NFR BLD: 1 % (ref 0–2)
BUN SERPL-MCNC: 5 MG/DL (ref 6–20)
CALCIUM SERPL-MCNC: 9.2 MG/DL (ref 8.6–10.4)
CHLORIDE SERPL-SCNC: 108 MMOL/L (ref 98–107)
CO2 SERPL-SCNC: 21 MMOL/L (ref 20–31)
CREAT SERPL-MCNC: 0.4 MG/DL (ref 0.5–0.9)
EOSINOPHIL # BLD: 0.28 K/UL (ref 0–0.44)
EOSINOPHILS RELATIVE PERCENT: 6 % (ref 1–4)
ERYTHROCYTE [DISTWIDTH] IN BLOOD BY AUTOMATED COUNT: 12 % (ref 11.8–14.4)
GFR SERPL CREATININE-BSD FRML MDRD: >90 ML/MIN/1.73M2
GLUCOSE SERPL-MCNC: 100 MG/DL (ref 74–99)
HCT VFR BLD AUTO: 36.2 % (ref 36.3–47.1)
HGB BLD-MCNC: 11.7 G/DL (ref 11.9–15.1)
IMM GRANULOCYTES # BLD AUTO: <0.03 K/UL (ref 0–0.3)
IMM GRANULOCYTES NFR BLD: 0 %
LYMPHOCYTES NFR BLD: 0.98 K/UL (ref 1.1–3.7)
LYMPHOCYTES RELATIVE PERCENT: 21 % (ref 24–43)
MAGNESIUM SERPL-MCNC: 2.2 MG/DL (ref 1.6–2.6)
MCH RBC QN AUTO: 32.7 PG (ref 25.2–33.5)
MCHC RBC AUTO-ENTMCNC: 32.3 G/DL (ref 28.4–34.8)
MCV RBC AUTO: 101.1 FL (ref 82.6–102.9)
MICROORGANISM SPEC CULT: NORMAL
MICROORGANISM SPEC CULT: NORMAL
MONOCYTES NFR BLD: 0.62 K/UL (ref 0.1–1.2)
MONOCYTES NFR BLD: 13 % (ref 3–12)
NEUTROPHILS NFR BLD: 59 % (ref 36–65)
NEUTS SEG NFR BLD: 2.81 K/UL (ref 1.5–8.1)
NRBC BLD-RTO: 0 PER 100 WBC
PLATELET # BLD AUTO: 231 K/UL (ref 138–453)
PMV BLD AUTO: 8.7 FL (ref 8.1–13.5)
POTASSIUM SERPL-SCNC: 3.4 MMOL/L (ref 3.7–5.3)
RBC # BLD AUTO: 3.58 M/UL (ref 3.95–5.11)
SERVICE CMNT-IMP: NORMAL
SERVICE CMNT-IMP: NORMAL
SODIUM SERPL-SCNC: 140 MMOL/L (ref 136–145)
SPECIMEN DESCRIPTION: NORMAL
SPECIMEN DESCRIPTION: NORMAL
WBC OTHER # BLD: 4.7 K/UL (ref 3.5–11.3)

## 2024-04-09 PROCEDURE — 99232 SBSQ HOSP IP/OBS MODERATE 35: CPT | Performed by: INTERNAL MEDICINE

## 2024-04-09 PROCEDURE — 74230 X-RAY XM SWLNG FUNCJ C+: CPT

## 2024-04-09 PROCEDURE — 83735 ASSAY OF MAGNESIUM: CPT

## 2024-04-09 PROCEDURE — 2580000003 HC RX 258

## 2024-04-09 PROCEDURE — 1240000000 HC EMOTIONAL WELLNESS R&B

## 2024-04-09 PROCEDURE — 6360000002 HC RX W HCPCS

## 2024-04-09 PROCEDURE — 92611 MOTION FLUOROSCOPY/SWALLOW: CPT

## 2024-04-09 PROCEDURE — 80048 BASIC METABOLIC PNL TOTAL CA: CPT

## 2024-04-09 PROCEDURE — 85025 COMPLETE CBC W/AUTO DIFF WBC: CPT

## 2024-04-09 PROCEDURE — 6370000000 HC RX 637 (ALT 250 FOR IP): Performed by: INTERNAL MEDICINE

## 2024-04-09 PROCEDURE — 6370000000 HC RX 637 (ALT 250 FOR IP): Performed by: NURSE PRACTITIONER

## 2024-04-09 PROCEDURE — 99222 1ST HOSP IP/OBS MODERATE 55: CPT | Performed by: INTERNAL MEDICINE

## 2024-04-09 PROCEDURE — 36415 COLL VENOUS BLD VENIPUNCTURE: CPT

## 2024-04-09 PROCEDURE — 6370000000 HC RX 637 (ALT 250 FOR IP)

## 2024-04-09 RX ORDER — BUTALBITAL, ACETAMINOPHEN AND CAFFEINE 50; 325; 40 MG/1; MG/1; MG/1
1 TABLET ORAL ONCE
Status: COMPLETED | OUTPATIENT
Start: 2024-04-09 | End: 2024-04-09

## 2024-04-09 RX ORDER — ACETAMINOPHEN 325 MG/1
650 TABLET ORAL EVERY 4 HOURS PRN
Status: DISCONTINUED | OUTPATIENT
Start: 2024-04-09 | End: 2024-04-12 | Stop reason: HOSPADM

## 2024-04-09 RX ORDER — LANOLIN ALCOHOL/MO/W.PET/CERES
3 CREAM (GRAM) TOPICAL NIGHTLY
Status: DISCONTINUED | OUTPATIENT
Start: 2024-04-09 | End: 2024-04-12 | Stop reason: HOSPADM

## 2024-04-09 RX ORDER — BUTALBITAL, ACETAMINOPHEN AND CAFFEINE 50; 325; 40 MG/1; MG/1; MG/1
1 TABLET ORAL ONCE
Status: DISCONTINUED | OUTPATIENT
Start: 2024-04-09 | End: 2024-04-09

## 2024-04-09 RX ORDER — IBUPROFEN 400 MG/1
400 TABLET ORAL ONCE
Status: DISCONTINUED | OUTPATIENT
Start: 2024-04-09 | End: 2024-04-09

## 2024-04-09 RX ORDER — LEVOFLOXACIN 750 MG/1
750 TABLET, FILM COATED ORAL DAILY
Status: DISCONTINUED | OUTPATIENT
Start: 2024-04-10 | End: 2024-04-09 | Stop reason: HOSPADM

## 2024-04-09 RX ORDER — POLYETHYLENE GLYCOL 3350 17 G
2 POWDER IN PACKET (EA) ORAL
Status: DISCONTINUED | OUTPATIENT
Start: 2024-04-09 | End: 2024-04-12 | Stop reason: HOSPADM

## 2024-04-09 RX ORDER — BUTALBITAL, ACETAMINOPHEN AND CAFFEINE 50; 325; 40 MG/1; MG/1; MG/1
2 TABLET ORAL ONCE
Status: COMPLETED | OUTPATIENT
Start: 2024-04-09 | End: 2024-04-09

## 2024-04-09 RX ORDER — POLYETHYLENE GLYCOL 3350 17 G/17G
17 POWDER, FOR SOLUTION ORAL DAILY PRN
Status: DISCONTINUED | OUTPATIENT
Start: 2024-04-09 | End: 2024-04-12 | Stop reason: HOSPADM

## 2024-04-09 RX ORDER — MAGNESIUM HYDROXIDE/ALUMINUM HYDROXICE/SIMETHICONE 120; 1200; 1200 MG/30ML; MG/30ML; MG/30ML
30 SUSPENSION ORAL EVERY 6 HOURS PRN
Status: DISCONTINUED | OUTPATIENT
Start: 2024-04-09 | End: 2024-04-12 | Stop reason: HOSPADM

## 2024-04-09 RX ORDER — GUAIFENESIN 600 MG/1
600 TABLET, EXTENDED RELEASE ORAL 2 TIMES DAILY
Status: DISCONTINUED | OUTPATIENT
Start: 2024-04-09 | End: 2024-04-09 | Stop reason: HOSPADM

## 2024-04-09 RX ORDER — CHOLECALCIFEROL (VITAMIN D3) 125 MCG
5 CAPSULE ORAL NIGHTLY PRN
Status: DISCONTINUED | OUTPATIENT
Start: 2024-04-09 | End: 2024-04-09 | Stop reason: HOSPADM

## 2024-04-09 RX ORDER — METOCLOPRAMIDE 10 MG/1
10 TABLET ORAL 4 TIMES DAILY PRN
Status: DISCONTINUED | OUTPATIENT
Start: 2024-04-09 | End: 2024-04-09 | Stop reason: HOSPADM

## 2024-04-09 RX ADMIN — METOCLOPRAMIDE 10 MG: 5 INJECTION, SOLUTION INTRAMUSCULAR; INTRAVENOUS at 17:50

## 2024-04-09 RX ADMIN — GUAIFENESIN 600 MG: 600 TABLET, EXTENDED RELEASE ORAL at 19:04

## 2024-04-09 RX ADMIN — PANTOPRAZOLE SODIUM 40 MG: 40 TABLET, DELAYED RELEASE ORAL at 06:02

## 2024-04-09 RX ADMIN — BUTALBITAL, ACETAMINOPHEN, AND CAFFEINE 1 TABLET: 50; 325; 40 TABLET ORAL at 12:44

## 2024-04-09 RX ADMIN — METOCLOPRAMIDE 10 MG: 5 INJECTION, SOLUTION INTRAMUSCULAR; INTRAVENOUS at 12:47

## 2024-04-09 RX ADMIN — BUTALBITAL, ACETAMINOPHEN, AND CAFFEINE 1 TABLET: 50; 325; 40 TABLET ORAL at 17:49

## 2024-04-09 RX ADMIN — POTASSIUM CHLORIDE 40 MEQ: 1500 TABLET, EXTENDED RELEASE ORAL at 19:04

## 2024-04-09 RX ADMIN — LITHIUM CARBONATE 300 MG: 300 TABLET ORAL at 17:12

## 2024-04-09 RX ADMIN — BUTALBITAL, ACETAMINOPHEN, AND CAFFEINE 2 TABLET: 50; 325; 40 TABLET ORAL at 04:31

## 2024-04-09 RX ADMIN — ENOXAPARIN SODIUM 40 MG: 100 INJECTION SUBCUTANEOUS at 10:45

## 2024-04-09 RX ADMIN — LITHIUM CARBONATE 300 MG: 300 TABLET ORAL at 10:44

## 2024-04-09 RX ADMIN — METOCLOPRAMIDE 10 MG: 5 INJECTION, SOLUTION INTRAMUSCULAR; INTRAVENOUS at 06:02

## 2024-04-09 RX ADMIN — SODIUM CHLORIDE, POTASSIUM CHLORIDE, SODIUM LACTATE AND CALCIUM CHLORIDE: 600; 310; 30; 20 INJECTION, SOLUTION INTRAVENOUS at 12:53

## 2024-04-09 RX ADMIN — Medication 3 MG: at 22:06

## 2024-04-09 ASSESSMENT — PAIN DESCRIPTION - ORIENTATION
ORIENTATION: MID
ORIENTATION: MID

## 2024-04-09 ASSESSMENT — PAIN DESCRIPTION - LOCATION
LOCATION: HEAD

## 2024-04-09 ASSESSMENT — PAIN SCALES - GENERAL
PAINLEVEL_OUTOF10: 7
PAINLEVEL_OUTOF10: 7
PAINLEVEL_OUTOF10: 6
PAINLEVEL_OUTOF10: 8
PAINLEVEL_OUTOF10: 7
PAINLEVEL_OUTOF10: 7

## 2024-04-09 ASSESSMENT — ENCOUNTER SYMPTOMS
BLOOD IN STOOL: 0
VOICE CHANGE: 1
PHOTOPHOBIA: 0
ABDOMINAL DISTENTION: 0
COUGH: 0
VOMITING: 0
SHORTNESS OF BREATH: 0
WHEEZING: 0
NAUSEA: 0

## 2024-04-09 ASSESSMENT — PAIN DESCRIPTION - DESCRIPTORS
DESCRIPTORS: ACHING
DESCRIPTORS: ACHING

## 2024-04-09 ASSESSMENT — SLEEP AND FATIGUE QUESTIONNAIRES
AVERAGE NUMBER OF SLEEP HOURS: 4
DO YOU HAVE DIFFICULTY SLEEPING: YES
DO YOU USE A SLEEP AID: YES
SLEEP PATTERN: DIFFICULTY FALLING ASLEEP;DISTURBED/INTERRUPTED SLEEP

## 2024-04-09 ASSESSMENT — LIFESTYLE VARIABLES
HOW MANY STANDARD DRINKS CONTAINING ALCOHOL DO YOU HAVE ON A TYPICAL DAY: 1 OR 2
HOW OFTEN DO YOU HAVE A DRINK CONTAINING ALCOHOL: MONTHLY OR LESS

## 2024-04-09 ASSESSMENT — PATIENT HEALTH QUESTIONNAIRE - PHQ9
SUM OF ALL RESPONSES TO PHQ QUESTIONS 1-9: 2
2. FEELING DOWN, DEPRESSED OR HOPELESS: SEVERAL DAYS
1. LITTLE INTEREST OR PLEASURE IN DOING THINGS: SEVERAL DAYS
SUM OF ALL RESPONSES TO PHQ QUESTIONS 1-9: 2
SUM OF ALL RESPONSES TO PHQ9 QUESTIONS 1 & 2: 2

## 2024-04-09 NOTE — PROCEDURES
INSTRUMENTAL SWALLOW REPORT  MODIFIED BARIUM SWALLOW    NAME: Rabia Rivera   : 1980  MRN: [id number]       Date of Eval: 2024              Referring Diagnosis(es):      Past Medical History:  has a past medical history of Bipolar 1 disorder, depressed (HCC), Fibromyalgia, Migraines, and Rheumatoid arthritis (HCC).  Past Surgical History:  has no past surgical history on file.      Type of Study: Initial MBS      Patient Complaints/Reason for Referral:  Rabia Rivera was referred for a MBS to assess the efficiency of his/her swallow function, assess for aspiration, and to make recommendations regarding safe dietary consistencies, effective compensatory strategies, and safe eating environment.       Onset of problem:      Rabia Rivera is a 43 y.o.  female who presents with Altered Mental Status (Sltered mental status unknown cause, alert but not answering questions)     Patient seen at the request of the critical care service for assumption of care     As documented in the medical record:  \"Patient was found confused at the bedside of significant other in medical ICU.  Patient was sternal rubbed to which she was responding.  Patient was taken to ER..  Sugar was in normal range.  Due to concern for airway protection, patient was intubated.  Patient has a previous history of suicide attempt for which a broad workup was done.  There were concerns that patient might have overdosed on her or her significant other's medication which were found in her purse.  Workup overall was unremarkable.  Serum and urine toxicology was negative except for cannabinoids.  Patient was agitated in the medical ICU and was started on sedation.    Patient started on Unasyn due to concerns of aspiration.   \Respiratory culture positive for Klebsiella aerogenes sensitive to Rocephin.  Patient also received charcoal 2 doses of 25 g each.  Also received 2 doses of Narcan 0.4mg    Patient also had EEG

## 2024-04-09 NOTE — H&P
[facility]  Office: [phone]  Curtis Momin DO, Jesse Madrigal DO, Stevie Carrillo DO, Yogesh Alejandre DO, Jas Sherman MD, Tatiana Hilliard MD, Tatum Lima MD, Maryana Burton MD,  Aravind Tillman MD, Evy Hays MD, Shira King MD,  Ashwin Bond DO, Aryan Romo MD, Valerio Swift MD, Maxx oMmin DO, Patricia Snow MD,  Cristian Pedro DO, Flor Ball MD, Kristal Bui MD, Fany Zarate MD, Mandie Marina MD,  Lemuel Grayson MD, Ángel Rust MD, Hakan Lo MD, Kisha Shah MD, Mikhail Khan MD, Miguelina Yan MD, Dexter Ireland DO, Gerry Emerson DO, Jessica Baltazar MD,  Shawn Amezquita MD, Shirley Waterhouse, CNP,  Kellie Pradhan, CNP, Joe Mendoza, CNP,  Saray Armendariz, DNP, Alana Renae, CNP, Any Mcfarlane, CNP, Vesta Vega, CNP, Reyna De Oliveira, CNP, Cici Ferreira, PA-C, Payal Richardson PA-C, Tonya Theodore, CNP, Denisha Mason, CNP, Elaine Carreon, CNP, Haydee Domingo, CNP, Araceli Starks, CNP, Vandana Ozuna, CNS, Lashay Hines, CNP, Chio Quinonez CNP, Tracy Schwab, CNP         IN-PATIENT SERVICE  History and Physical  [facility]          Name: Rabia Rivera  MRN: [id number]     Acct: [id number]  Room: 2018/2018-01    Admit Date: 4/4/2024  PCP: No primary care provider on file.    Chief Complaint:  Chief Complaint   Patient presents with    Altered Mental Status     Sltered mental status unknown cause, alert but not answering questions        History of Present Illness:  Rabia Rivera is a 43 y.o.  female who presents with Altered Mental Status (Sltered mental status unknown cause, alert but not answering questions)    Patient seen at the request of the critical care service for assumption of care    As documented in the medical record:  \"Patient was found confused at the bedside of significant other in medical ICU.  Patient was sternal rubbed to which she was responding.  Patient was taken to ER..  Sugar was in 
patient contact, management of life support systems, review of data including imaging and labs, discussions with other team members and physicians at least 50 min so far today, excluding procedures.       Please note that this chart was generated using voice recognition Dragon dictation software. Although every effort was made to ensure the accuracy of this automated transcription, some errors in transcription may have occurred.     Melvin Brian MD  4/5/2024 11:05 AM

## 2024-04-09 NOTE — CARE COORDINATION
Case Management Assessment  Initial Evaluation    Date/Time of Evaluation: 4/9/2024 12:44 PM  Assessment Completed by: Vane Gregory RN    If patient is discharged prior to next notation, then this note serves as note for discharge by case management.    Patient Name: Rabia Rivera                   YOB: 1980  Diagnosis: Altered mental status [R41.82]                   Date / Time: 4/4/2024  5:34 PM    Patient Admission Status: Inpatient   Readmission Risk (Low < 19, Mod (19-27), High > 27): Readmission Risk Score: 9.3    Current PCP: Amelia Betancourt MD  PCP verified by CM? (P) Yes    Chart Reviewed: Yes      History Provided by: Patient  Patient Orientation: Alert and Oriented    Patient Cognition: Alert    Hospitalization in the last 30 days (Readmission):  No    If yes, Readmission Assessment in CM Navigator will be completed.    Advance Directives:      Code Status: Full Code   Patient's Primary Decision Maker is: Legal Next of Kin    Primary Decision Maker: Vincent Mhaoney - Brother/Sister - [phone]    Discharge Planning:    Patient lives with: (P) Children Type of Home: (P) House  Primary Care Giver: Self  Patient Support Systems include: Family Members   Current Financial resources: (P) Medicare  Current community resources:    Current services prior to admission: (P) None            Current DME:              Type of Home Care services:  (P) None    ADLS  Prior functional level: (P) Independent in ADLs/IADLs  Current functional level: (P) Independent in ADLs/IADLs    PT AM-PAC:   /24  OT AM-PAC:   /24    Family can provide assistance at DC: (P) Yes  Would you like Case Management to discuss the discharge plan with any other family members/significant others, and if so, who?    Plans to Return to Present Housing: (P) Unknown at present  Other Identified Issues/Barriers to RETURNING to current housing: suicide precautions  Potential Assistance needed at discharge: (P) N/A

## 2024-04-09 NOTE — PLAN OF CARE
Problem: Discharge Planning  Goal: Discharge to home or other facility with appropriate resources  4/5/2024 1408 by Dotty Kelly, RN  Outcome: Not Progressing    Problem: Safety - Medical Restraint  Goal: Remains free of injury from restraints (Restraint for Interference with Medical Device)  Description: INTERVENTIONS:  1. Determine that other, less restrictive measures have been tried or would not be effective before applying the restraint  2. Evaluate the patient's condition at the time of restraint application  3. Inform patient/family regarding the reason for restraint  4. Q2H: Monitor safety, psychosocial status, comfort, nutrition and hydration  4/5/2024 1408 by Dotty Kelly, RN  Outcome: Progressing  Flowsheets  Taken 4/5/2024 1400  Remains free of injury from restraints (restraint for interference with medical device): Every 2 hours: Monitor safety, psychosocial status, comfort, nutrition and hydration  Taken 4/5/2024 1200  Remains free of injury from restraints (restraint for interference with medical device): Every 2 hours: Monitor safety, psychosocial status, comfort, nutrition and hydration  Taken 4/5/2024 1000  Remains free of injury from restraints (restraint for interference with medical device): Every 2 hours: Monitor safety, psychosocial status, comfort, nutrition and hydration  Taken 4/5/2024 0800  Remains free of injury from restraints (restraint for interference with medical device): Every 2 hours: Monitor safety, psychosocial status, comfort, nutrition and hydration     Problem: Pain  Goal: Verbalizes/displays adequate comfort level or baseline comfort level  4/5/2024 1408 by Dotty Kelly, RN  Outcome: Progressing     Problem: Skin/Tissue Integrity  Goal: Absence of new skin breakdown  Description: 1.  Monitor for areas of redness and/or skin breakdown  2.  Assess vascular access sites hourly  3.  Every 4-6 hours minimum:  Change oxygen saturation probe site  4.  Every 
  Problem: Discharge Planning  Goal: Discharge to home or other facility with appropriate resources  4/8/2024 1952 by Doreen Hines, RN  Outcome: Progressing  Flowsheets (Taken 4/8/2024 1900)  Discharge to home or other facility with appropriate resources: Identify barriers to discharge with patient and caregiver  4/8/2024 1122 by Dotty Kelly, RN  Outcome: Progressing     Problem: Respiratory - Adult  Goal: Achieves optimal ventilation and oxygenation  4/8/2024 1952 by Doreen Hines, RN  Outcome: Progressing  Flowsheets (Taken 4/8/2024 1900)  Achieves optimal ventilation and oxygenation: Assess for changes in respiratory status  4/8/2024 0805 by Heaven Red RCP  Outcome: Progressing  Flowsheets (Taken 4/7/2024 0727)  Achieves optimal ventilation and oxygenation:   Assess for changes in respiratory status   Assess for changes in mentation and behavior   Position to facilitate oxygenation and minimize respiratory effort   Oxygen supplementation based on oxygen saturation or arterial blood gases   Encourage broncho-pulmonary hygiene including cough, deep breathe, incentive spirometry   Assess the need for suctioning and aspirate as needed   Assess and instruct to report shortness of breath or any respiratory difficulty   Respiratory therapy support as indicated     Problem: Pain  Goal: Verbalizes/displays adequate comfort level or baseline comfort level  4/8/2024 1952 by Doreen Hines RN  Outcome: Progressing  Flowsheets (Taken 4/8/2024 1900)  Verbalizes/displays adequate comfort level or baseline comfort level: Encourage patient to monitor pain and request assistance  4/8/2024 1122 by Dotty Kelly, RN  Outcome: Progressing     Problem: Skin/Tissue Integrity  Goal: Absence of new skin breakdown  Description: 1.  Monitor for areas of redness and/or skin breakdown  2.  Assess vascular access sites hourly  3.  Every 4-6 hours minimum:  Change oxygen saturation probe site  4.  Every 4-6 hours:  If on 
  Problem: Respiratory - Adult  Goal: Achieves optimal ventilation and oxygenation  4/5/2024 0802 by Dotty Whitehead, DAVIDP  Outcome: Progressing     Problem: Skin/Tissue Integrity  Goal: Absence of new skin breakdown  Description: 1.  Monitor for areas of redness and/or skin breakdown  2.  Assess vascular access sites hourly  3.  Every 4-6 hours minimum:  Change oxygen saturation probe site  4.  Every 4-6 hours:  If on nasal continuous positive airway pressure, respiratory therapy assess nares and determine need for appliance change or resting period.  4/5/2024 0802 by Dotty Whitehead, DAVIDP  Outcome: Progressing     
  Problem: Respiratory - Adult  Goal: Achieves optimal ventilation and oxygenation  4/7/2024 0727 by Heaven Red RCP  Outcome: Progressing  Flowsheets (Taken 4/7/2024 0727)  Achieves optimal ventilation and oxygenation:   Assess for changes in respiratory status   Assess for changes in mentation and behavior   Position to facilitate oxygenation and minimize respiratory effort   Oxygen supplementation based on oxygen saturation or arterial blood gases   Encourage broncho-pulmonary hygiene including cough, deep breathe, incentive spirometry   Assess the need for suctioning and aspirate as needed   Assess and instruct to report shortness of breath or any respiratory difficulty   Respiratory therapy support as indicated  4/7/2024 0228 by Lydia Edwards, RN  Outcome: Progressing     Problem: Skin/Tissue Integrity  Goal: Absence of new skin breakdown  Description: 1.  Monitor for areas of redness and/or skin breakdown  2.  Assess vascular access sites hourly  3.  Every 4-6 hours minimum:  Change oxygen saturation probe site  4.  Every 4-6 hours:  If on nasal continuous positive airway pressure, respiratory therapy assess nares and determine need for appliance change or resting period.  4/7/2024 0727 by Heaven Red RCP  Outcome: Progressing  4/7/2024 0228 by Lydia Edwards, RN  Outcome: Progressing     
  Problem: Respiratory - Adult  Goal: Achieves optimal ventilation and oxygenation  4/8/2024 0805 by Heaven Red RCP  Outcome: Progressing  Flowsheets (Taken 4/7/2024 0727)  Achieves optimal ventilation and oxygenation:   Assess for changes in respiratory status   Assess for changes in mentation and behavior   Position to facilitate oxygenation and minimize respiratory effort   Oxygen supplementation based on oxygen saturation or arterial blood gases   Encourage broncho-pulmonary hygiene including cough, deep breathe, incentive spirometry   Assess the need for suctioning and aspirate as needed   Assess and instruct to report shortness of breath or any respiratory difficulty   Respiratory therapy support as indicated  4/7/2024 2135 by Lydia Edwards, RN  Outcome: Progressing     
  Problem: Respiratory - Adult  Goal: Achieves optimal ventilation and oxygenation  Outcome: Progressing   PROVIDE ADEQUATE OXYGENATION WITH ACCEPTABLE SP02/ABG'S    [x]  IDENTIFY APPROPRIATE OXYGEN THERAPY  [x]   MONITOR SP02/ABG'S AS NEEDED   [x]   PATIENT EDUCATION AS NEEDED  Problem: OXYGENATION/RESPIRATORY FUNCTION  Goal: Patient will maintain patent airway  Outcome: Ongoing  Goal: Patient will achieve/maintain normal respiratory rate/effort  Respiratory rate and effort will be within normal limits for the patient  Outcome: Ongoing    Problem: MECHANICAL VENTILATION  Goal: Patient will maintain patent airway  Outcome: Ongoing  Goal: Oral health is maintained or improved  Outcome: Ongoing  Goal: ET tube will be managed safely  Outcome: Ongoing  Goal: Ability to express needs and understand communication  Outcome: Ongoing  Goal: Mobility/activity is maintained at optimum level for patient  Outcome: Ongoing    Problem: ASPIRATION PRECAUTIONS  Goal: Patient’s risk of aspiration is minimized  Outcome: Ongoing    Problem: SKIN INTEGRITY  Goal: Skin integrity is maintained or improved  Outcome: Ongoing                    
  Problem: Safety - Medical Restraint  Goal: Remains free of injury from restraints (Restraint for Interference with Medical Device)  Description: INTERVENTIONS:  1. Determine that other, less restrictive measures have been tried or would not be effective before applying the restraint  2. Evaluate the patient's condition at the time of restraint application  3. Inform patient/family regarding the reason for restraint  4. Q2H: Monitor safety, psychosocial status, comfort, nutrition and hydration  4/7/2024 2135 by Lydia Edwards RN  Outcome: Progressing  Flowsheets (Taken 4/7/2024 2000)  Remains free of injury from restraints (restraint for interference with medical device): Every 2 hours: Monitor safety, psychosocial status, comfort, nutrition and hydration  4/7/2024 1218 by Deanna Hall RN  Outcome: Progressing     Problem: Discharge Planning  Goal: Discharge to home or other facility with appropriate resources  4/7/2024 2135 by Lydia Edwards RN  Outcome: Progressing  4/7/2024 1218 by Deanna Hall RN  Outcome: Progressing     Problem: Respiratory - Adult  Goal: Achieves optimal ventilation and oxygenation  4/7/2024 2135 by Lydia Edwards RN  Outcome: Progressing  4/7/2024 1218 by Deanna Hall RN  Outcome: Progressing     Problem: Pain  Goal: Verbalizes/displays adequate comfort level or baseline comfort level  4/7/2024 2135 by Lydia Edwards RN  Outcome: Progressing  4/7/2024 1218 by Deanna Hall RN  Outcome: Progressing     Problem: Skin/Tissue Integrity  Goal: Absence of new skin breakdown  Description: 1.  Monitor for areas of redness and/or skin breakdown  2.  Assess vascular access sites hourly  3.  Every 4-6 hours minimum:  Change oxygen saturation probe site  4.  Every 4-6 hours:  If on nasal continuous positive airway pressure, respiratory therapy assess nares and determine need for appliance change or resting period.  4/7/2024 2135 by Lydia Edwards RN  Outcome: Progressing  4/7/2024 
  Problem: Safety - Medical Restraint  Goal: Remains free of injury from restraints (Restraint for Interference with Medical Device)  Description: INTERVENTIONS:  1. Determine that other, less restrictive measures have been tried or would not be effective before applying the restraint  2. Evaluate the patient's condition at the time of restraint application  3. Inform patient/family regarding the reason for restraint  4. Q2H: Monitor safety, psychosocial status, comfort, nutrition and hydration  Outcome: Progressing  Flowsheets  Taken 4/6/2024 2200 by Lydia Edwards RN  Remains free of injury from restraints (restraint for interference with medical device): Every 2 hours: Monitor safety, psychosocial status, comfort, nutrition and hydration  Taken 4/6/2024 2000 by Marilyn Lazar RN  Remains free of injury from restraints (restraint for interference with medical device):   Every 2 hours: Monitor safety, psychosocial status, comfort, nutrition and hydration   Inform patient/family regarding the reason for restraint   Evaluate the patient's condition at the time of restraint application   Determine that other, less restrictive measures have been tried or would not be effective before applying the restraint  Taken 4/6/2024 1600 by Deanna Hall RN  Remains free of injury from restraints (restraint for interference with medical device): Evaluate the patient's condition at the time of restraint application     Problem: Discharge Planning  Goal: Discharge to home or other facility with appropriate resources  Outcome: Progressing     Problem: Respiratory - Adult  Goal: Achieves optimal ventilation and oxygenation  Outcome: Progressing     Problem: Pain  Goal: Verbalizes/displays adequate comfort level or baseline comfort level  Outcome: Progressing     Problem: Skin/Tissue Integrity  Goal: Absence of new skin breakdown  Description: 1.  Monitor for areas of redness and/or skin breakdown  2.  Assess vascular access sites 
  Problem: Self Harm/Suicidality  Goal: Will have no self-injury during hospital stay  Description: INTERVENTIONS:  1.  Ensure constant observer at bedside with Q15M safety checks  2.  Maintain a safe environment  3.  Secure patient belongings  4.  Ensure family/visitors adhere to safety recommendations  5.  Ensure safety tray has been added to patient's diet order  6.  Every shift and PRN: Re-assess suicidal risk via Frequent Screener    Outcome: Progressing     
Problem: OXYGENATION/RESPIRATORY FUNCTION  Goal: Patient will maintain patent airway  Outcome: Ongoing  Goal: Patient will achieve/maintain normal respiratory rate/effort  Respiratory rate and effort will be within normal limits for the patient  Outcome: Ongoing    Problem: MECHANICAL VENTILATION  Goal: Patient will maintain patent airway  Outcome: Ongoing  Goal: Oral health is maintained or improved  Outcome: Ongoing  Goal: ET tube will be managed safely  Outcome: Ongoing  Goal: Ability to express needs and understand communication  Outcome: Ongoing  Goal: Mobility/activity is maintained at optimum level for patient  Outcome: Ongoing    Problem: ASPIRATION PRECAUTIONS  Goal: Patient’s risk of aspiration is minimized  Outcome: Ongoing    Problem: SKIN INTEGRITY  Goal: Skin integrity is maintained or improved  Outcome: Ongoing                    
Problem: OXYGENATION/RESPIRATORY FUNCTION  Goal: Patient will maintain patent airway  Outcome: Ongoing  Goal: Patient will achieve/maintain normal respiratory rate/effort  Respiratory rate and effort will be within normal limits for the patient  Outcome: Ongoing    Problem: MECHANICAL VENTILATION  Goal: Patient will maintain patent airway  Outcome: Ongoing  Goal: Oral health is maintained or improved  Outcome: Ongoing  Goal: ET tube will be managed safely  Outcome: Ongoing  Goal: Ability to express needs and understand communication  Outcome: Ongoing  Goal: Mobility/activity is maintained at optimum level for patient  Outcome: Ongoing    Problem: ASPIRATION PRECAUTIONS  Goal: Patient’s risk of aspiration is minimized  Outcome: Ongoing    Problem: SKIN INTEGRITY  Goal: Skin integrity is maintained or improved  Outcome: Ongoing                    
Talked with patient's brother (Vincent Mahoney - [phone]) . Updated him everything about patient's current condition.  Mr. Mahoney was given the opportunity to ask questions.  All the questions were answered in the presence of RN.  He told me he will call tomorrow morning again.    Mack Singleton MD  Internal Medicine Resident,PGY 2  [facility],[place]   
Monitor safety, psychosocial status, comfort, nutrition and hydration  Taken 4/5/2024 1200  Remains free of injury from restraints (restraint for interference with medical device): Every 2 hours: Monitor safety, psychosocial status, comfort, nutrition and hydration  Taken 4/5/2024 1000  Remains free of injury from restraints (restraint for interference with medical device): Every 2 hours: Monitor safety, psychosocial status, comfort, nutrition and hydration  Taken 4/5/2024 0800  Remains free of injury from restraints (restraint for interference with medical device): Every 2 hours: Monitor safety, psychosocial status, comfort, nutrition and hydration     Problem: Discharge Planning  Goal: Discharge to home or other facility with appropriate resources  4/5/2024 2304 by Lydia Edwards RN  Outcome: Progressing  4/5/2024 1408 by Dotty Kelly RN  Outcome: Not Progressing     Problem: Respiratory - Adult  Goal: Achieves optimal ventilation and oxygenation  4/5/2024 2304 by Lydia Edwards RN  Outcome: Progressing  4/5/2024 2001 by Cathleen Zapata RCP  Outcome: Progressing     Problem: Pain  Goal: Verbalizes/displays adequate comfort level or baseline comfort level  4/5/2024 2304 by Lydia Edwards RN  Outcome: Progressing  4/5/2024 1408 by Dotty Kelly RN  Outcome: Progressing     Problem: Skin/Tissue Integrity  Goal: Absence of new skin breakdown  Description: 1.  Monitor for areas of redness and/or skin breakdown  2.  Assess vascular access sites hourly  3.  Every 4-6 hours minimum:  Change oxygen saturation probe site  4.  Every 4-6 hours:  If on nasal continuous positive airway pressure, respiratory therapy assess nares and determine need for appliance change or resting period.  4/5/2024 2304 by Lydia Edwards RN  Outcome: Progressing  4/5/2024 1408 by Dotty Kelly RN  Outcome: Progressing     Problem: Safety - Adult  Goal: Free from fall injury  4/5/2024 2304 by Lydia Edwards 
Progressing  4/5/2024 2304 by Lydia Edwards RN  Outcome: Progressing     Problem: Respiratory - Adult  Goal: Achieves optimal ventilation and oxygenation  4/6/2024 0759 by Deanna Hall RN  Outcome: Progressing  4/5/2024 2304 by Lydia Edwards RN  Outcome: Progressing  4/5/2024 2001 by Cathleen Zapata RCP  Outcome: Progressing     Problem: Pain  Goal: Verbalizes/displays adequate comfort level or baseline comfort level  4/6/2024 0759 by Deanna Hall RN  Outcome: Progressing  4/5/2024 2304 by Lydia Edwards RN  Outcome: Progressing     Problem: Skin/Tissue Integrity  Goal: Absence of new skin breakdown  Description: 1.  Monitor for areas of redness and/or skin breakdown  2.  Assess vascular access sites hourly  3.  Every 4-6 hours minimum:  Change oxygen saturation probe site  4.  Every 4-6 hours:  If on nasal continuous positive airway pressure, respiratory therapy assess nares and determine need for appliance change or resting period.  4/6/2024 0759 by Deanna Hall RN  Outcome: Progressing  4/5/2024 2304 by Lydia Edwards RN  Outcome: Progressing     Problem: Safety - Adult  Goal: Free from fall injury  4/6/2024 0759 by Deanna Hall RN  Outcome: Progressing  4/5/2024 2304 by Lydia Edwards RN  Outcome: Progressing     Problem: ABCDS Injury Assessment  Goal: Absence of physical injury  4/6/2024 0759 by Deanna Hall RN  Outcome: Progressing  4/5/2024 2304 by Lydia Edwards RN  Outcome: Progressing     Problem: Nutrition Deficit:  Goal: Optimize nutritional status  4/6/2024 0759 by Deanna Hall RN  Outcome: Progressing  4/5/2024 2304 by Lydia Edwards RN  Outcome: Progressing     
Progressing  Flowsheets (Taken 4/7/2024 0727)  Achieves optimal ventilation and oxygenation:   Assess for changes in respiratory status   Assess for changes in mentation and behavior   Position to facilitate oxygenation and minimize respiratory effort   Oxygen supplementation based on oxygen saturation or arterial blood gases   Encourage broncho-pulmonary hygiene including cough, deep breathe, incentive spirometry   Assess the need for suctioning and aspirate as needed   Assess and instruct to report shortness of breath or any respiratory difficulty   Respiratory therapy support as indicated  4/7/2024 0228 by Lydia Edwards RN  Outcome: Progressing     Problem: Pain  Goal: Verbalizes/displays adequate comfort level or baseline comfort level  4/7/2024 1218 by Deanna Hall RN  Outcome: Progressing  4/7/2024 0228 by Lydia Edwards RN  Outcome: Progressing     Problem: Skin/Tissue Integrity  Goal: Absence of new skin breakdown  Description: 1.  Monitor for areas of redness and/or skin breakdown  2.  Assess vascular access sites hourly  3.  Every 4-6 hours minimum:  Change oxygen saturation probe site  4.  Every 4-6 hours:  If on nasal continuous positive airway pressure, respiratory therapy assess nares and determine need for appliance change or resting period.  4/7/2024 1218 by Deanna Hall RN  Outcome: Progressing  4/7/2024 0727 by Heaven Red HECTOR  Outcome: Progressing  4/7/2024 0228 by Lydia Edwards RN  Outcome: Progressing     Problem: Safety - Adult  Goal: Free from fall injury  4/7/2024 1218 by Deanna Hall RN  Outcome: Progressing  4/7/2024 0228 by Lydia Edwards RN  Outcome: Progressing     Problem: ABCDS Injury Assessment  Goal: Absence of physical injury  4/7/2024 1218 by Deanna Hall RN  Outcome: Progressing  4/7/2024 0228 by Lydia Edwards RN  Outcome: Progressing     Problem: Nutrition Deficit:  Goal: Optimize nutritional status  4/7/2024 1218 by Deanna Hall RN  Outcome: 
RCP  Outcome: Progressing     Problem: Pain  Goal: Verbalizes/displays adequate comfort level or baseline comfort level  Outcome: Progressing     Problem: Skin/Tissue Integrity  Goal: Absence of new skin breakdown  Description: 1.  Monitor for areas of redness and/or skin breakdown  2.  Assess vascular access sites hourly  3.  Every 4-6 hours minimum:  Change oxygen saturation probe site  4.  Every 4-6 hours:  If on nasal continuous positive airway pressure, respiratory therapy assess nares and determine need for appliance change or resting period.  Outcome: Progressing     Problem: Safety - Adult  Goal: Free from fall injury  Outcome: Progressing     Problem: ABCDS Injury Assessment  Goal: Absence of physical injury  Outcome: Progressing     
assigned personnel  7. Initiate Psychosocial CNS and Spiritual Care consult, as indicated  4/8/2024 1122 by Dotty Kelly, RN  Outcome: Progressing     Problem: Safety - Medical Restraint  Goal: Remains free of injury from restraints (Restraint for Interference with Medical Device)  Description: INTERVENTIONS:  1. Determine that other, less restrictive measures have been tried or would not be effective before applying the restraint  2. Evaluate the patient's condition at the time of restraint application  3. Inform patient/family regarding the reason for restraint  4. Q2H: Monitor safety, psychosocial status, comfort, nutrition and hydration  4/8/2024 1122 by Dotty Kelly, RN  Outcome: Completed  Flowsheets  Taken 4/8/2024 1000  Remains free of injury from restraints (restraint for interference with medical device): Every 2 hours: Monitor safety, psychosocial status, comfort, nutrition and hydration  Taken 4/8/2024 0800  Remains free of injury from restraints (restraint for interference with medical device): Every 2 hours: Monitor safety, psychosocial status, comfort, nutrition and hydration     
recommendations  5.  Ensure safety tray has been added to patient's diet order  6.  Every shift and PRN: Re-assess suicidal risk via Frequent Screener    4/9/2024 0521 by Shahida Hurt, GREG  Outcome: Progressing  Flowsheets (Taken 4/9/2024 0000 by Doreen Hines, RN)  Will have no self-injury during hospital stay:   Ensure constant observer at bedside with Q15M safety checks   Maintain a safe environment   Secure patient belongings   Ensure family/visitors adhere to safety recommendations   Ensure safety tray has been added to patient's diet order   Every shift and PRN: Re-assess suicidal risk via Frequent Screener  4/8/2024 1952 by Doreen Hines, RN  Outcome: Progressing  Flowsheets (Taken 4/8/2024 1900)  Will have no self-injury during hospital stay:   Ensure constant observer at bedside with Q15M safety checks   Maintain a safe environment   Secure patient belongings   Ensure family/visitors adhere to safety recommendations   Ensure safety tray has been added to patient's diet order   Every shift and PRN: Re-assess suicidal risk via Frequent Screener     
obtain sitter and review sitter guidelines with assigned personnel   Initiate Psychosocial Clinical Nurse Specialist and Spiritual Care consult, as indicated  4/9/2024 0521 by Shahida Hurt RN  Outcome: Progressing  Flowsheets (Taken 4/9/2024 0000 by Doreen Hines, RN)  Effect of thought disturbance (confusion, delirium, dementia, or psychosis) are managed with adequate functional status:   [place] high risk fall precautions, as indicated   Assess for contributors to thought disturbance, including medications, impaired vision or hearing, underlying metabolic abnormalities, dehydration, psychiatric diagnoses, notify LIP     Problem: Self Harm/Suicidality  Goal: Will have no self-injury during hospital stay  Description: INTERVENTIONS:  1.  Ensure constant observer at bedside with Q15M safety checks  2.  Maintain a safe environment  3.  Secure patient belongings  4.  Ensure family/visitors adhere to safety recommendations  5.  Ensure safety tray has been added to patient's diet order  6.  Every shift and PRN: Re-assess suicidal risk via Frequent Screener    4/9/2024 1810 by Francie Flaherty RN  Outcome: Progressing  Flowsheets (Taken 4/9/2024 1810)  Will have no self-injury during hospital stay:   Ensure constant observer at bedside with Q15M safety checks   Maintain a safe environment   Secure patient belongings   Ensure family/visitors adhere to safety recommendations   Ensure safety tray has been added to patient's diet order   Every shift and PRN: Re-assess suicidal risk via Frequent Screener  4/9/2024 0521 by Shahida Hurt RN  Outcome: Progressing  Flowsheets (Taken 4/9/2024 0000 by Doreen Hines, RN)  Will have no self-injury during hospital stay:   Ensure constant observer at bedside with Q15M safety checks   Maintain a safe environment   Secure patient belongings   Ensure family/visitors adhere to safety recommendations   Ensure safety tray has been added to patient's diet

## 2024-04-10 PROBLEM — F31.4 BIPOLAR I DISORDER, MOST RECENT EPISODE DEPRESSED, SEVERE WITHOUT PSYCHOTIC FEATURES (HCC): Status: ACTIVE | Noted: 2024-04-10

## 2024-04-10 PROCEDURE — 92526 ORAL FUNCTION THERAPY: CPT

## 2024-04-10 PROCEDURE — 92610 EVALUATE SWALLOWING FUNCTION: CPT

## 2024-04-10 PROCEDURE — APPSS60 APP SPLIT SHARED TIME 46-60 MINUTES: Performed by: NURSE PRACTITIONER

## 2024-04-10 PROCEDURE — 99222 1ST HOSP IP/OBS MODERATE 55: CPT | Performed by: PSYCHIATRY & NEUROLOGY

## 2024-04-10 PROCEDURE — 6370000000 HC RX 637 (ALT 250 FOR IP): Performed by: NURSE PRACTITIONER

## 2024-04-10 PROCEDURE — 6370000000 HC RX 637 (ALT 250 FOR IP): Performed by: INTERNAL MEDICINE

## 2024-04-10 PROCEDURE — 1240000000 HC EMOTIONAL WELLNESS R&B

## 2024-04-10 PROCEDURE — 99222 1ST HOSP IP/OBS MODERATE 55: CPT | Performed by: INTERNAL MEDICINE

## 2024-04-10 RX ORDER — METAXALONE 800 MG/1
800 TABLET ORAL 3 TIMES DAILY
Status: DISCONTINUED | OUTPATIENT
Start: 2024-04-10 | End: 2024-04-12 | Stop reason: HOSPADM

## 2024-04-10 RX ORDER — POTASSIUM CHLORIDE 20 MEQ/1
40 TABLET, EXTENDED RELEASE ORAL ONCE
Status: COMPLETED | OUTPATIENT
Start: 2024-04-10 | End: 2024-04-10

## 2024-04-10 RX ORDER — BUTALBITAL, ACETAMINOPHEN AND CAFFEINE 300; 40; 50 MG/1; MG/1; MG/1
2 CAPSULE ORAL EVERY 8 HOURS PRN
Status: DISCONTINUED | OUTPATIENT
Start: 2024-04-10 | End: 2024-04-12 | Stop reason: HOSPADM

## 2024-04-10 RX ORDER — LITHIUM CARBONATE 300 MG/1
300 CAPSULE ORAL 3 TIMES DAILY
Status: DISCONTINUED | OUTPATIENT
Start: 2024-04-10 | End: 2024-04-12 | Stop reason: HOSPADM

## 2024-04-10 RX ORDER — ONDANSETRON 4 MG/1
8 TABLET, ORALLY DISINTEGRATING ORAL DAILY PRN
Status: DISCONTINUED | OUTPATIENT
Start: 2024-04-10 | End: 2024-04-12 | Stop reason: HOSPADM

## 2024-04-10 RX ADMIN — LITHIUM CARBONATE 300 MG: 300 CAPSULE, GELATIN COATED ORAL at 15:16

## 2024-04-10 RX ADMIN — POTASSIUM CHLORIDE 40 MEQ: 1500 TABLET, EXTENDED RELEASE ORAL at 15:15

## 2024-04-10 RX ADMIN — LITHIUM CARBONATE 300 MG: 300 CAPSULE, GELATIN COATED ORAL at 22:02

## 2024-04-10 RX ADMIN — BUTALBITA,ACETAMINOPHEN AND CAFFEINE 2 CAPSULE: 50; 300; 40 CAPSULE ORAL at 15:16

## 2024-04-10 RX ADMIN — METAXALONE 800 MG: 800 TABLET ORAL at 22:02

## 2024-04-10 RX ADMIN — METAXALONE 800 MG: 800 TABLET ORAL at 15:18

## 2024-04-10 ASSESSMENT — LIFESTYLE VARIABLES
HOW MANY STANDARD DRINKS CONTAINING ALCOHOL DO YOU HAVE ON A TYPICAL DAY: 1 OR 2
HOW OFTEN DO YOU HAVE A DRINK CONTAINING ALCOHOL: MONTHLY OR LESS
HOW OFTEN DO YOU HAVE A DRINK CONTAINING ALCOHOL: MONTHLY OR LESS
HOW MANY STANDARD DRINKS CONTAINING ALCOHOL DO YOU HAVE ON A TYPICAL DAY: 1 OR 2
HOW OFTEN DO YOU HAVE A DRINK CONTAINING ALCOHOL: MONTHLY OR LESS
HOW MANY STANDARD DRINKS CONTAINING ALCOHOL DO YOU HAVE ON A TYPICAL DAY: 1 OR 2

## 2024-04-10 ASSESSMENT — PAIN SCALES - GENERAL
PAINLEVEL_OUTOF10: 0
PAINLEVEL_OUTOF10: 3
PAINLEVEL_OUTOF10: 1

## 2024-04-10 ASSESSMENT — PAIN DESCRIPTION - LOCATION: LOCATION: HEAD

## 2024-04-10 NOTE — CARE COORDINATION
BHI Biopsychosocial Assessment    Current Level of Psychosocial Functioning     Independent XX  Dependent    Minimal Assist     Comments:    Psychosocial High Risk Factors (check all that apply)    Unable to obtain meds   Chronic illness/pain  XX  Substance abuse   Lack of Family Support   Financial stress   Isolation   Inadequate Community Resources XX  Suicide attempt(s)  Not taking medications   Victim of crime   Developmental Delay  Unable to manage personal needs    Age 65 or older   Homeless  No transportation   Readmission within 30 days  Unemployment  Traumatic Event    Comments:   Psychiatric Advanced Directives: n/a    Family to Involve in Treatment: sister is supportive    Sexual Orientation: n/a     Patient Strengths: Patient has insurance, social support, housing, and linked to psychiatry    Patient Barriers: significant psychosocial loss      Opiate Education Provided: denies substance abuse of any type        CMHC/mental health history: Linked to Dr. Candido Ochoa    Plan of Care   medication management, group/individual therapies, family meetings, psycho -education, treatment team meetings to assist with stabilization    Initial Discharge Plan: Discharge to sisters home and continue established services with Candido Ochoa        Clinical Summary: Patient is a 43 year old female admitted to [facility] for suicidal ideation. Patient reports a hx of prior admissions and suicide attempts. Patient identifies the loss of her  as her primary stressor. Patient endorses feeling significant grief. Patient identifies her sister Wilda as supportive and plans to go stay with her at discharge. She reports Wilda being her only local family member. Her step mom lives in [place]. Patient has stable housing, income, and insurance. Patient denies substance use of any type. She denies any history of abuse. She denies and legal problems. Patient is linked to Dr. Candido Ochoa for med management. She states he

## 2024-04-10 NOTE — H&P
[facility] Internal Medicine  Solo Mercado MD; Duran Briggs MD, Sabino Colmenares MD, Caitlyn Holm MD, Sonya Villegas MD; Amanda Alcaraz MD    [facility] Internal Medicine   IN-PATIENT SERVICE   [facility]     HISTORY AND PHYSICAL EXAMINATION            Date:   4/10/2024  Patient name:  Rabia Rivera  Date of admission:  4/9/2024  8:27 PM  MRN:   [id number]  Account:  [id number]  YOB: 1980  PCP:    Amelia Betancourt MD  Room:   [address] [place] [address]  Code Status:    Full Code      Chief Complaint:     Respiratory failure aspiration pneumonia    History Obtained From:     Patient/EMR/bedside RN     History of Present Illness:     43-year-old female with a severe protein calorie malnutrition BMI of 18.64 evidence of fat loss muscle loss history of migraine  Rheumatoid arthritis fibromyalgia was admitted to [facility] ICU with altered mental status respiratory failure intubated sedated sputum positive for Klebsiella sensitive to Rocephin patient was admitted on 4 May discharged on 9 May treated with antibiotics for 5 days on discharge white cell count was within normal range patient discharged to behavioral health unit    Past Medical History:     Past Medical History:   Diagnosis Date    Bipolar 1 disorder, depressed (HCC)     Fibromyalgia     Migraines     Rheumatoid arthritis (HCC)         Past Surgical History:     History reviewed. No pertinent surgical history.     Medications Prior to Admission:     Prior to Admission medications    Medication Sig Start Date End Date Taking? Authorizing Provider   acyclovir (ZOVIRAX) 400 MG tablet Take 1 tablet by mouth 2 times daily    ProviderJackson MD   butalbital-APAP-caffeine (FIORICET) -40 MG CAPS per capsule Take 2 capsules by mouth every 8 hours as needed for Headaches    ProviderJackson MD   lithium (LITHOBID) 300 MG extended release tablet Take 1 tablet by mouth 3 times 
female with history of depression admitted from the medical floor where she was presented following a suicide attempt by intentional overdose on multiple pills of baclofen with an intent to kill self.  Reports that her  of last few years passed away on 1 April following which she was extremely distraught and depressed.  Reports feeling helpless and hopeless.  Mentions that she sees Dr. Candido Allen and has been compliant on her lithium however the loss of her  has been extremely heavy on her.  Mentions that she regrets her behaviors now.  Somewhat hopeful about her recovery.  Identifies productive factors has good support from her sister and wanted to take care of the 2 stepchildren.  Discussed with her about restarting home medications and titrating to effect.  Patient is agreeable to the plan.     ASSESSMENT  Bipolar I disorder, most recent episode depressed, severe without psychotic features (HCC)    TREATMENT PLAN  Reviewed labs, EKG  Will obtain records/collateral information and review them today.  Medication adjustment: Will restart home meds and titrate to effect  Consults: none    Risk level: High     Behavioral Services  Medicare Certification     Admission Day 1  I certify that this patient's inpatient psychiatric hospital admission is medically necessary for:    x (1) treatment which could reasonably be expected to improve this patient's condition, or    x (2) diagnostic study or its equivalent.       --Ana Jerry MD on 4/10/2024 at 8:51 PM    An electronic signature was used to authenticate this note.     **This report has been created using voice recognition software. It may contain minor errors which are inherent in voice recognition technology.**

## 2024-04-10 NOTE — BH NOTE
[facility]  Admission Note     Admission Type:   Involuntary - Not Signed in Upon Admission     Reason for admission:  Reason for Admission: Patient overdosed on baclofen in a suicide attempt after  passed away in the ICU in his hospital room. Patient was found there unresponsive and intubated then admitted to medical floor. Patient is still currently having suicidal thoughts at this time.      Addictive Behavior:   Addictive Behavior  In the Past 3 Months, Have You Felt or Has Someone Told You That You Have a Problem With  : None    Medical Problems:   Past Medical History:   Diagnosis Date    Bipolar 1 disorder, depressed ([facility])     Fibromyalgia     Migraines     Rheumatoid arthritis ([facility])        Status EXAM:  Mental Status and Behavioral Exam  Normal: No  Level of Assistance: Independent/Self  Facial Expression: Flat, Sad, Worried  Affect: Constricted  Level of Consciousness: Alert  Frequency of Checks: 4 times per hour, close  Mood:Normal: No  Mood: Depressed, Anxious, Helpless, Sad  Motor Activity:Normal: No  Motor Activity: Decreased, Unusual posture/gait  Eye Contact: Good  Observed Behavior: Preoccupied, Cooperative, Guarded  Sexual Misconduct History: Current - no  Preception: [place] to person, [place] to time, [place] to place  Attention:Normal: No  Attention: Distractible  Thought Processes: Circumstantial  Thought Content:Normal: No  Thought Content: Preoccupations  Depression Symptoms: Feelings of helplessness, Feelings of hopelessess, Impaired concentration  Anxiety Symptoms: Generalized  Marzena Symptoms: No problems reported or observed.  Hallucinations: None  Delusions: No  Memory:Normal: Yes  Insight and Judgment: No  Insight and Judgment: Poor judgment, Poor insight    Tobacco Screening:  Practical Counseling, on admission, javier X, if applicable and completed (first 3 are required if patient doesn't refuse):            (x ) Recognizing danger situations (included triggers and

## 2024-04-10 NOTE — DISCHARGE SUMMARY
THE ABDOMEN 4/4/2024 5:42 pm COMPARISON: None. HISTORY: ORDERING SYSTEM PROVIDED HISTORY: Confirmation of course of NG/OG/NE tube and location of tip of tube TECHNOLOGIST PROVIDED HISTORY: Confirmation of course of NG/OG/NE tube and location of tip of tube Portable?->Yes FINDINGS: The NG tube tip and side hole terminate in the region of the body of stomach.     NG tube tip and side hole terminate in the region of the body of stomach.     XR CHEST PORTABLE    Result Date: 4/4/2024  EXAMINATION: ONE XRAY VIEW OF THE CHEST 4/4/2024 4:59 pm COMPARISON: None. HISTORY: ORDERING SYSTEM PROVIDED HISTORY: vomiting TECHNOLOGIST PROVIDED HISTORY: vomiting FINDINGS: Lungs are clear. There is no pleural effusion or pneumothorax. Heart is normal in size. Pulmonary vascular markings are normal. No acute osseous abnormalities are seen.  Endotracheal tube and feeding tube are optimally positioned.  Metallic fragments are seen over the left chest.     No radiographic evidence of an acute cardiopulmonary process.  Optimally positioned endotracheal tube and feeding tube.         Consultations:    Consults:     Final Specialist Recommendations/Findings:   IP CONSULT TO CRITICAL CARE  IP CONSULT TO DIETITIAN  IP CONSULT TO PSYCHIATRY  IP CONSULT TO NEUROLOGY        Discharged Condition:    Stable     Disposition: [facility]    Physician Follow Up:   Amelia Betancourt MD in one week     Activity:  activity as tolerated    Diet:  regular diet     Discharge Medications:      Medication List        ASK your doctor about these medications      acyclovir 400 MG tablet  Commonly known as: ZOVIRAX     Fioricet -40 MG Caps per capsule  Generic drug: butalbital-APAP-caffeine     lithium 300 MG extended release tablet  Commonly known as: LITHOBID     metaxalone 800 MG tablet  Commonly known as: SKELAXIN     ondansetron 8 MG Tbdp disintegrating tablet  Commonly known as: ZOFRAN-ODT     traZODone 100 MG tablet  Commonly known as: DESYREL

## 2024-04-10 NOTE — PLAN OF CARE
Problem: Self Harm/Suicidality  Goal: Will have no self-injury during hospital stay  Description: INTERVENTIONS:  1.  Ensure constant observer at bedside with Q15M safety checks  2.  Maintain a safe environment  3.  Secure patient belongings  4.  Ensure family/visitors adhere to safety recommendations  5.  Ensure safety tray has been added to patient's diet order  6.  Every shift and PRN: Re-assess suicidal risk via Frequent Screener    Outcome: Progressing  Note: No self harm behaviors noted. Patient denies suicidal and homicidal ideation and auditory and visual hallucinations and verbally agrees to approach staff if thoughts of self harm arises. Patient is isolative to self and room. Patient is thought blocking and slow to respond. Patient admits to depression and anxiety. Reassurance and support provided. Q15 minute checks maintained. Patient remains safe at this time.

## 2024-04-10 NOTE — BH NOTE
Patient given tobacco quitline number 70335550840 at this time, refusing to call at this time, states \" I just dont want to quit now\"- patient given information as to the dangers of long term tobacco use. Continue to reinforce the importance of tobacco cessation.

## 2024-04-10 NOTE — PLAN OF CARE
[facility]  Initial Interdisciplinary Treatment Plan Note      Original treatment plan Date & Time: 4/10/2024   1245    Admission Type:  Admission Type: Involuntary    Reason for admission:   Reason for Admission: Patient overdosed on baclofen in a suicide attempt after  passed away in the ICU in his hospital room. Patient was found there unresponsive and intubated then admitted to medical floor. Patient is still currently having suicidal thoughts at this time.    Estimated Length of Stay:  5-7days  Estimated Discharge Date: To be determined by physician.    PATIENT STRENGTHS:  Patient Strengths:   Patient Strengths and Limitations:   Addictive Behavior: Addictive Behavior  In the Past 3 Months, Have You Felt or Has Someone Told You That You Have a Problem With  : None  Medical Problems:  Past Medical History:   Diagnosis Date    Bipolar 1 disorder, depressed ([facility])     Fibromyalgia     Migraines     Rheumatoid arthritis ([facility])      Status EXAM:Mental Status and Behavioral Exam  Normal: No  Level of Assistance: Independent/Self  Facial Expression: Flat, Sad, Worried  Affect: Constricted  Level of Consciousness: Alert  Frequency of Checks: 4 times per hour, close  Mood:Normal: No  Mood: Depressed, Anxious, Helpless, Sad  Motor Activity:Normal: No  Motor Activity: Decreased  Eye Contact: Good  Observed Behavior: Preoccupied, Cooperative, Guarded, Withdrawn  Sexual Misconduct History: Current - no  Preception: [place] to person, [place] to time, [place] to place  Attention:Normal: No  Attention: Distractible  Thought Processes: Circumstantial  Thought Content:Normal: No  Thought Content: Preoccupations  Depression Symptoms: Feelings of helplessness, Feelings of hopelessess, Impaired concentration  Anxiety Symptoms: Generalized  Marzena Symptoms: No problems reported or observed.  Hallucinations: None  Delusions: No  Memory:Normal: Yes  Insight and Judgment: No  Insight and Judgment: Poor judgment, Poor

## 2024-04-10 NOTE — BH NOTE
Best practice advisory for suicide precautions popped up for patient. Patient agreeable to staying safe while in the hospital. Evelia Gottlieb NP notified and states that q15min safety rounding on patient is sufficient. Order for suicidal precautions discontinued.

## 2024-04-10 NOTE — PROGRESS NOTES
04/04/24 1854   ETT    Placement Date: 04/04/24   Present on Admission/Arrival: No  Placed By: In ED  Placement Verified By: Colorimetric ETCO2 device;Auscultation  Preoxygenation: Yes  Airway Type: Cuffed  Airway Tube Size: 7 mm  Laryngoscope: GlideScope  Location: Oral  I...   Secured At (S)  22 cm  (advanced 1cm per CXR)   Measured From (S)  Teeth       
  [facility] Neurology Specialist  [address]  [place] [address]  PH:  [phone] or [phone]  FAX:  [phone]            Brief history: Rabia Rivera is a 43 y.o. old female admitted on 4/4/2024 with altered mental status     Subjective: No new neurological events overnight. Patient denies any new weakness, numbness, tingling or headache.     Objective: /72   Pulse 58   Temp 98.5 °F (36.9 °C) (Oral)   Resp 18   Ht 1.524 m (5')   Wt 45 kg (99 lb 3.3 oz)   SpO2 98%   BMI 19.38 kg/m²       Medications:    lithium  300 mg Orogastric TID    sodium chloride flush  5-40 mL IntraVENous 2 times per day    enoxaparin  40 mg SubCUTAneous Daily        General examination:    Head: Normocephalic, atraumatic  Eyes: Extraocular movements intact  Lungs: Respirations unlabored, chest wall no deformity  ENT: Normal external ear canals, no sinus tenderness  Heart: Regular rate rhythm  Abdomen: No masses, tenderness  Extremities: No cyanosis or edema, 2+ pulses  Skin: Intact, normal skin color    Neurological examination:    Mental status   Alert and oriented; intact memory with no confusion, speech or language problems; no hallucinations or delusions     Cranial nerves   II - visual fields intact to confrontation                                                III, IV, VI - extra-ocular muscles full: no pupillary defect; no LESLEE, no nystagmus, no ptosis   V - normal facial sensation                                                               VII - normal facial symmetry                                                             VIII - intact hearing                                                                             IX, X - symmetrical palate                                                                  XI - symmetrical shoulder shrug                                                       XII - midline tongue without atrophy or fasciculation     Motor function  Normal 
  Physician Progress Note      PATIENT:               PIERRE MONTENEGRO  CSN #:                  [id number]  :                       1980  ADMIT DATE:       2024 5:34 PM  DISCH DATE:  RESPONDING  PROVIDER #:        Jesse Madrigal DO          QUERY TEXT:    Pt admitted with AMS. Pt noted to have Intubated and placed on vent. Per H&P;   There was a concern that patient ingested some of her home medication that   includes baclofen, trazodone, lithium.  Lithium levels were 0.3.  UDS was   positive for cannabinol. Patient received  charcoal solution 25 g in the ED.    She was at 1 point unresponsive to any verbal stimuli and could not move her   extremities.   If possible, please document in the progress notes and   discharge summary if you are evaluating and / or treating any of the   following:    The medical record reflects the following:  Risk Factors: hypoxia, Lithium levels were low 0.3  Clinical Indicators: /81>87/46, Pulse 105 >41, Temp 96.8>99, Resp   36>28>9, SpO2 71, VBG pH 7.482/37.3 pCO2/24.7 pO2/27.9 bicarb, Cannabinoid   Scrn, Ur positive, Lactic acid 2.6 , Potassium 3.5, Anion gap 11 per ccPN   4/5;Agitated on 100 of fentanyl and 20 of propofol. Received propofol boluses,   despite that patient remains agitated.  She was having sinus bradycardia with   heart rate in 40s in the ED. Currently heart rate is in 100s while agitated.   per 4/8 PN;  Nursing staff reports that she did disclose that she   intentionally overdosed on her significant other's baclofen.  Treatment: intubated and sedated vent setting PRVC mode/14 RR/30% FiO2/+8 of   PEEP, 0.9 % sodium chloride infusion, sodium chloride 0.9 % bolus 1,000 mL,    Unasyn IV, charcoal solution 25 g, lactated ringers IV soln infusion 125   ml/hr, narcan,    Thank you, Please call if questions  Mary Chin RN [facility]  [phone]  Options provided:  -- Drug-induced encephalopathy due to  baclofen  -- Metabolic encephalopathy  -- 
  Physician Progress Note      PATIENT:               PIERRE MONTENEGRO  CSN #:                  [id number]  :                       1980  ADMIT DATE:       2024 5:34 PM  DISCH DATE:  RESPONDING  PROVIDER #:        Jesse Madrigal DO          QUERY TEXT:    Pt admitted with AMS.  Pt noted to be have Intubated and placed on vent. Per   H&P;  progressively getting altered and had vomited couple of times.  She was   at 1 point unresponsive to any verbal stimuli and could not move her   extremities.  Because of impending airway deterioration and being completely   altered and unresponsive patient was intubated.  During the episode of   unresponsiveness her saturation dropped to low 80s.  If possible, please   document in the progress notes and discharge summary if you are evaluating   and/or treating any of the following:    The medical record reflects the following:  Risk Factors: AMS, Hypoxia  Clinical Indicators: /81>87/46, Pulse 105 >41, Temp 96.8>99, Resp   36>28>9, SpO2 71, VBG pH 7.482/37.3 pCO2/24.7 pO2/27.9 bicarb, Cannabinoid   Scrn, Ur positive, Lactic acid 2.6  Treatment: intubated and sedated vent setting PRVC mode/14 RR/30% FiO2/+8 of   PEEP    Thank you, Please call if questions  Mary Chin RN [facility]  [phone]  Options provided:  -- Acute respiratory failure with hypoxia  -- Other - I will add my own diagnosis  -- Disagree - Not applicable / Not valid  -- Disagree - Clinically unable to determine / Unknown  -- Refer to Clinical Documentation Reviewer    PROVIDER RESPONSE TEXT:    This patient is in acute respiratory failure with hypoxia.    Query created by: Mary Donald on 2024 10:44 AM      Electronically signed by:  Jesse Madrigal DO 2024 12:38 PM          
 Physician Progress Note    Patient - Rabia Rivera  Date of Admission -  4/4/2024  5:34 PM  Date of Evaluation -  4/9/2024  Room and Bed Number -  2018/2018-01   Hospital Day - 5    Chief Complaint   Patient presents with    Altered Mental Status     Sltered mental status unknown cause, alert but not answering questions      SUBJECTIVE:     No acute events.  Extubated and transferred out of ICU.  Brief History:   89 y.o. who became intubated because of altered mentation and hypoxia.  Patient's close family member is currently admitted in medical ICU.  Patient has not been eating very well, and had an episode of vomiting yesterday while at bedside of her significant other medical ICU.  Patient went to the bathroom and then she was stumbling when she was coming out and then slouched over the couch.  RN went to check on the patient and found her confused and only responding to sternal rub.  patient was sent into the ED for further evaluation for that in the ER patient was progressively getting altered and had vomited couple of times.  She was at 1 point unresponsive to any verbal stimuli and could not move her extremities.  Stat CT head was ordered which showed no acute intracranial abnormality.  Because of impending airway deterioration and being completely altered and unresponsive patient was intubated.  During the episode of unresponsiveness her saturation dropped to low 80s.  She was getting progressively more altered.  There was a concern that patient ingested some of her home medication that includes baclofen, trazodone, lithium.  Lithium levels were low 0.3.  UDS was positive for cannabinol.  UA was negative.  Chest x-ray showed no acute intrathoracic process and optimally positioned the ET tube and feeding tube.  As per RT, patient had a lot of junk during post intubation suctioning and there is a concern for aspiration pneumonia.      Patient started on Unasyn due to concerns of aspiration, 3 g 
 brought over patient's coat that was left in her significant other's room at bedside.  Patient was intubated and unable to respond.  Informed medical staff of the presence of patient belongings.         04/06/24 2127   Encounter Summary   Encounter Overview/Reason  Follow-up   Service Provided For: Patient   Referral/Consult From: [employer] Unknown   Last Encounter  04/06/24   Complexity of Encounter Low   Begin Time 2120   End Time  2125   Total Time Calculated 5 min   Assessment/Intervention/Outcome   Assessment Unable to assess     Electronically signed by Sin Alonso on 4/6/2024 at 9:29 PM      
4/10/24  0610-received call from pt \"sister\" mary who was inquiring about pt d/c and if she went to [facility]. I advised that she was moved yesterday.   
[facility]  PROGRESS NOTE    Shift date: 4/4/2024  Shift day: Wednesday   Shift # 2    Room # 26/26   Name: Kasi Hammer                Muslim:    Place of Mormonism:     Referral: Rapid Response    Admit Date & Time: 4/4/2024  5:34 PM    Assessment:  Kasi Hammer is a 89 y.o. female in the hospital who was brought from room 3008 per RN to ED 26.  responded to rapid response.      Intervention:   met with RN staff in MICU who stated patient was going to ED.  met medical staff at ED 26.  provided a ministry of presence to patient and staff. Patient was intubated by medical team.  looked for family contact. Patients contact on facesheet is patient in [phone].  located a parent in care everywhere - mother Theodore Cotto [phone] and left message.  gave update to medical staff.     Outcome:   unable to assess, patient did not respond.    Plan:  Chaplains will remain available to offer spiritual and emotional support as needed.      Electronically signed by Chaplain Clayton, on 4/4/2024 at 9:25 PM.  [facility]  [phone]      
Comprehensive Nutrition Assessment    Type and Reason for Visit:  Reassess    Nutrition Recommendations/Plan:   Modify diet to regular, mildly thick liquids per SLP recommendation  Consider start ZinkoTek ONS once a day (Will need thickened)  Monitor appetite, intakes, ONS, weight, labs, GI status, chewing/swallowing.     Malnutrition Assessment:  Malnutrition Status:  Insufficient data (04/05/24 1222)    Context:  Acute Illness     Findings of the 6 clinical characteristics of malnutrition:  Energy Intake:  75% or less of estimated energy requirements for 7 or more days  Weight Loss:  Unable to assess     Body Fat Loss:  No significant body fat loss Orbital   Muscle Mass Loss:  Unable to assess    Fluid Accumulation:  No significant fluid accumulation     Strength:  Not Performed    Nutrition Assessment:    Extubated, no sedation. TF d/c. Pt at swallow study at time of visit. SLP recommends reg diet with midly thick liquids. 0% intake at breakfast, 4 applesauces overnight. Hypoactive bowel sounds, no BM recorded. Discussed ZinkoTek ONS with RN. Magic cup contains skim milk. Noted LR at 60 mL/hr. Noted reglan 10 mg q 6 hrs. Glu 100 mg/dL, K 3.4 mmol/L.    Nutrition Related Findings:    Meds/labs reviewed. Wound Type: None       Current Nutrition Intake & Therapies:    Average Meal Intake: 0%, 1-25%  Average Supplements Intake: None Ordered  ADULT DIET; Easy to Chew; Mildly Thick (Nectar); Safety Tray, No red dye, Gluten Free; Safety Tray (Disposables)  Additional Calorie Sources:  none    Anthropometric Measures:  Height: 152.4 cm (5')  Ideal Body Weight (IBW): 100 lbs (45 kg)    Admission Body Weight: 44.4 kg (97 lb 14.2 oz)  Current Body Weight: 43.3 kg (95 lb 7.4 oz), 95.5 % IBW. Weight Source: Bed Scale  Current BMI (kg/m2): 18.6  Weight Adjustment For: No Adjustment  BMI Categories: Normal Weight (BMI 18.5-24.9)    Estimated Daily Nutrient Needs:  Energy Requirements Based On: Kcal/kg  Weight Used for 
Date: 4/4/2024    Patient identity confirmed:  Yes  Indications: airway protection  Preoxygenation: yes    Laryngoscope size and type Glidescope 3  Airway introducer used: No  Evac: No  ETT size:a 7.0 cuffed  Number of attempts:2   Cords visualized:  [x] Clearly  [] Poorly  Breath sounds present bilaterally: Yes   ETCO2   [x] Positive   ETT secured at  21 @ teeth    ETT secured with lexis  Chest x-ray ordered: Yes     Difficult airway:    Yes       If yes, was red tape placed around ETT:   Yes    Was this a Code Situation:    No             BP: 117/73        Procedure performed by: Dr. Anatoly De La Vega RCP  6:46 PM                  
Department of Psychiatry  Consult Progress Note  4/8/2024    Patient Name: Rabia Rivera    Reason for initial consult: Intentional overdose         Interval History:      Patient was initially interviewed by psychiatry while she was intubated, she was able to engage in some dialogue by blinking her eyes and shaking her head yes or no however she was extubated this morning at 1030 and able to verbally engage.  Nursing staff reports that she did disclose that she intentionally overdosed on her significant other's baclofen.  According to nursing documentation she reported that \"she took a partial hand full of her significant other's Baclofen because she couldn't stand watching him die.  She was unable to state how many pills she took.\"    Upon approach she is immediately tearful, she endorses depression and reports that she is sad that he did not \"wake up with me\".  She reports that she is thankful that she is still alive.  She is able to identify protective factors and other family members.  She reports that she has not spoken to her stepmother, she is nervous about making that phone call.  She has a history of suicide attempts, she reports that her last significant attempt was when she was a child.  She has a history of being hospitalized in inpatient psychiatric units.  She reports that she is compliant with taking her lithium that is prescribed by Dr. Ochoa.  She reports that he is the only physician that she continues to follow-up with regularly.  She is tearful, she reports significant grief and states \"feeling sad is not the word\".  She is concerned about housing stability and reports that she needs to \"find the well\".  She reports that her  has been sick for some time, he has been in a coma, she is worried about the relationship that she has with his son.  She reports that her relationship with his daughter is poor.  She verbalized understanding that she will need to transition to the 
INTENSIVE CARE UNIT  Resident Physician Progress Note    Patient - Rabia Rivera  Date of Admission -  4/4/2024  5:34 PM  Date of Evaluation -  4/8/2024  Room and Bed Number -  3003/3003-01   Hospital Day - 4      SUBJECTIVE:     CHIEF COMPLAINT:      Altered mentation and being unresponsive      OVERNIGHT EVENTS:     No acute events overnight    TODAY:    Fluids:100cc/hr LR  Feeding: tube feeds 30ml/hr  Analgesics:650mg tylenol Q6 and fentanyl 25 Q1PRN   Sedation:150mcg/hr and precedex 1.5mcg/kg/hr  Thrombo-prophylaxis: Enoxaparin 40 Mg once daily  Mobilization:NA  Head Up:  Hemodynamics: Stable not on pressors  Ulcer Prophylaxis:  NA  Glycemic Control:100-150s. Not on any regimen  Spontaneous breathing trial:None  Bowel management:Polyethylene glycol and ondansetron prn   Indwelling catheter:  Drug De-escalation: Wean off Versed and propofol      Vent settings  14/360/5/30  ABG 7.371/39.2/97.6/ on FiO2 30%    UO 4.6 L last 24 hours    Labs  Creatinine 0.4, BUN 7, sodium 134, potassium 3.4-replaced mag 2.1  Hemoglobin 10.9, WBC 6.7, platelets 163  Blood cultures with no growth so far  Respiratory cultures pending  Osmolality 285, urine sodium 121, urine osmolality 318, lactic acid 1.1    KUB showed numerous punctate metallic foreign bodies overlying left midlung and left hilum, MRI is not safe?  Echo 4/6/2024 - LVEF 55-60%, normal diastolic function, mild aortic valve sclerosis, mild AR, mild TR, moderately elevated RVSP 42 mmHg consistent with moderate pulmonary hypertension.    Remains on Unasyn for suspected aspiration PNA    Neurology - EEG shows no epileptiform discharges. Unable to obtain MRI d/t metal fragments    Psych- will continue to follow and see if patient need inpatient hospitalization, once extubated  Brief History:   89 y.o. who became intubated because of altered mentation and hypoxia.  Patient's close family member is currently admitted in medical ICU.  Patient has not been eating very 
NEUROLOGY INPATIENT PROGRESS NOTE    4/7/2024         Current Exam:     Chart reviewed. Discussed with RN.  MRI brain cannot be done due to metal fragments.  EEG is pending.  On sedation holiday earlier the patient was able to follow some commands. She is restless in bed, still on Versed and Fentanyl drips.         Brief History:    Rabia Rivera is a  43 y.o. female who was admitted on 4/4/2024.  She was a family member here in the hospital visiting her significant other who was admitted; she was found in the room having episodes of emesis and was then found minimally responsive in the chair.  A rapid response was called and she was taken to the ED; she was intubated for airway protection and there was a concern of patient taking her home meds including baclofen, lithium, trazodone although this was not confirmed.  She received activated charcoal in the ED and was admitted to the medical ICU.  CT head was done showing no acute intracranial abnormalities.       No current facility-administered medications on file prior to encounter.     Current Outpatient Medications on File Prior to Encounter   Medication Sig Dispense Refill    acyclovir (ZOVIRAX) 400 MG tablet Take 1 tablet by mouth 2 times daily      butalbital-APAP-caffeine (FIORICET) -40 MG CAPS per capsule Take 2 capsules by mouth every 8 hours as needed for Headaches      lithium (LITHOBID) 300 MG extended release tablet Take 1 tablet by mouth 3 times daily      metaxalone (SKELAXIN) 800 MG tablet Take 1 tablet by mouth 3 times daily      traZODone (DESYREL) 100 MG tablet Take 1 tablet by mouth nightly as needed for Sleep      ondansetron (ZOFRAN-ODT) 8 MG TBDP disintegrating tablet Place 1 tablet under the tongue daily as needed for Nausea or Vomiting         Allergies: Rabia Rivera has No Known Allergies.    No past medical history on file.    No past surgical history on file.    Social History: Rabia Rivera      No family 
[facility]  [place]    Shift date: 2024   Shift day: Monday   Shift # 1    Room # 3003/3003-01   Name: Rabia Rivera                Yarsani: Mosque   Place of Yarsanism:     Referral:  Request from patient to call friend and SO's daughter    Admit Date & Time: 2024  5:34 PM    Assessment:  Rabia Rivera is a 43 y.o. female. Upon entering the room writer observes pt awake and alert and appearing calm and to be coping      Intervention:  Pt is familiar with this  from previous visits when her SO, Mark, was here. Mark  this past Saturday. Pt wanted  to call her friend Vane, and Mark's daughter, Judy.  spoke with Vane and notified her that she could visit pt, at pt's request.  left message for Judy and asked her to call  with  arrangements for her dad that  could pass on to patient.  provided support to patient through active listening and words of affirmation, and prayed with her at her request.     Outcome:  Pt expressed gratitude for support.     Plan:  Chaplains will remain available to offer spiritual and emotional support as needed.      Electronically signed by Chaplain Aurelia, on 2024 at 1:38 PM.  [facility]  [phone]      24 1336   Encounter Summary   Encounter Overview/Reason  Spiritual/Emotional Needs   Service Provided For: Patient   Referral/Consult From: Patient   Support System Friends/neighbors   Last Encounter  24   Complexity of Encounter Moderate   Begin Time 1220   End Time  1240   Total Time Calculated 20 min   Spiritual/Emotional needs   Type Spiritual Support   Assessment/Intervention/Outcome   Assessment Coping   Intervention Active listening;Explored/Affirmed feelings, thoughts, concerns;Prayer (assurance of)/[place]  (Contacted family, friend)   Outcome Expressed feelings, needs, and concerns;Expressed 
Order obtained for extubation.  SpO2 of 98 on 30% FiO2.   Patient extubated and placed on room air.   Post extubation SpO2 is 98% with HR  63 bpm and RR 16 breaths/min.    Patient had strong cough that was productive of white sputum.  Extubation Well tolerated by patient..   Breath Sounds: clear    YAA LEMUS RCP   10:29 AM  
Pt unable to answer admission question at this time r/t intubation. No family at bedside at this time.   Electronically signed by Melisa Vergara RN on 4/4/2024 at 11:30 PM    
Radiologist Dr. DIANE Soares said there are foreign metal fragments in the patients Left lung area.  Due to the presence of foreign metal objects, the patient cannot have an MRI.  
Report given to [facility] staff. All questions answered. Pt. Already en route to facility. Belongings sent with pt.   
[facility] - [facility]  PROGRESS NOTE    Shift date: 24  Shift day: Monday   Shift # 1    Room # 3003/3003-01   Name: Rabia Rivera                Baptism:    Place of Religious:     Referral: Routine Visit    Admit Date & Time: 2024  5:34 PM    Assessment:  Rabia Rivera is a 43 y.o. female in the hospital. Nurse called for  assistance to share news that , Mark Frazier, passed away on Saturday while pt was intubated. Upon entering the room writer observes pt awake in bed, sitter in the room. Pt appeared calm when resident, nurse, and  entered the room.       Intervention:  Resident and nurse shared news with pt that Mark passed away. Pt was tearful and appeared sad.  asked if pt wanted to contact anyone at this time. Pt shared that they didn't have their phone. After looking,  found pt's belongings in the cabinet in the room wrapped in bags. Pt is under suicide watch at this time. Nurse was able to get number from phone for Wilda (who pt wanted contacted, said that Wilda is sister). Nurse took phone back and bagged the item and put it away.  shared that daughter, Aimee, signed the release of body form and that the  was taken to [facility]  Home. Pt asked for  to call  home about information. Chapalin called [place]  home and they indicated that daughter, Aimee, was coming today to discuss plans for .  shared information with pt.  asked if pt and  were legally . Pt stated that they were  at [facility] 2023 by the . After conferring with another , pt and  had a ceremony at the hospital, but were not legally .  notified  home that pt and  were not legally .  provided a ministry of presence and active listening.     Outcome:  Due to shift change,  passed information onto next 
SLP ALL NOTES  Facility/Department: [facility] 3- MICU   CLINICAL BEDSIDE SWALLOW EVALUATION    NAME: Rabia Rivera  : 1980  MRN: [id number]    ADMISSION DATE: 2024  ADMITTING DIAGNOSIS: has Altered mental status and Episodes of decreased attentiveness on their problem list.    Date of Eval: 2024  Evaluating Therapist: GASPER HOUSER    Current Diet level:  Current Diet : NPO  Current Liquid Diet : NPO    Primary Complaint  Kasi Hammer is a 89 y.o. who became intubated because of altered mentation and hypoxia.  Patient's close family member is currently admitted in medical ICU.  Rapid response was called yesterday as the patient was getting more confused and had a episode of fall.  Patient was sent into the ED for further evaluation for that in the ER patient was progressively getting altered and had vomited couple of times.  She was at 1 point unresponsive to any verbal stimuli and could not move her extremities.  Stat CT head was ordered which showed no acute intracranial abnormality.  Because of impending airway deterioration and being completely altered and unresponsive patient was intubated.  During the episode of unresponsiveness her saturation dropped to low 80s.  She was getting progressively more altered.  There was a concern that patient ingested some of her home medication that includes baclofen, trazodone, lithium.  Lithium levels were 0.3.  UDS was positive for cannabinol.  UA was negative.  Chest x-ray showed no acute intrathoracic process and optimally positioned the ET tube and feeding tube.  As per RT, patient had a lot of junk during post intubation suctioning and there is a concern for aspiration pneumonia.     Pain:  Pain Assessment  Pain Assessment: 0-10  Pain Level: 8  Pain Location: Chest (cc resident aware)    Reason for Referral  Rabia Rivera was referred for a bedside swallow evaluation to assess the efficiency of her swallow function, identify 
Trans-in from Car3 to room 2018. Patient Aox4 with sitter at the  bedside.On continuous suicidal precaution. Placed on cardiac monitor,initial assessment and vital signs obtained. Not in distress and follow commands. Will continue to monitor.  
stressors in patient's life and patient reported overdosing on significant others baclofen.     Psych recommendations: Patient meets criteria for involuntary psychiatric hospitalization given intentional overdose  Continue one-to-one sitter     Continue lithium 300 mg 3 times daily as prescribed by outpatient provider  Recommend adding trazodone 50mg PO qHS PRN insomnia  Recommend increasing Atarax to 50 mg 3 times daily as needed anxiety, okay to give an additional dose now if primary team is okay with it          Procedures during patient's ICU stay:     Intubation    Current Vitals:     /64   Pulse 64   Temp 98.5 °F (36.9 °C) (Oral)   Resp 17   Ht 1.524 m (5')   Wt 45 kg (99 lb 3.3 oz)   SpO2 99%   BMI 19.38 kg/m²       Cultures:     Blood cultures:                 [] None drawn      [] Negative             []  Positive (Details:  )  Urine Culture:                   [] None drawn      [] Negative             []  Positive (Details:  )  Sputum Culture:               [] None drawn       [] Negative             []  Positive (Details:  )   Endotracheal aspirate:     [] None drawn       [] Negative             []  Positive (Details:  )       Consults:     1.  Psychiatry    Assessment:     Patient Active Problem List    Diagnosis Date Noted    Episodes of decreased attentiveness 04/06/2024    Altered mental status 04/04/2024         Recommended Follow-up:     Follow-up on psych recommendation  [facility] transfer  Continue Rocephin for aspiration pneumonia.     Above mentioned assessment and plan was discussed by me with the admitting medicine resident. The medicine team assigned to the patient by medicine admitting resident will be following up the patient from now onwards on the floor.     Nadeen Bansal MD  PGY-2, Internal Medicine Resident  [facility], [place]         4/8/2024, 7:37 PM    
  04/06/24 0300 105/63 100.2 °F (37.9 °C) -- 82 15 100 %   04/06/24 0230 (!) 112/59 100.2 °F (37.9 °C) -- 85 12 100 %   04/06/24 0200 (!) 109/59 100.4 °F (38 °C) -- 81 13 99 %         Intake/Output Summary (Last 24 hours) at 4/6/2024 0948  Last data filed at 4/6/2024 0600  Gross per 24 hour   Intake 3574.27 ml   Output 3560 ml   Net 14.27 ml     Date 04/06/24 0000 - 04/06/24 2359   Shift 8169-6047 3705-1939 8061-5488 24 Hour Total   INTAKE   I.V.(mL/kg) 833.7(18.8)   833.7(18.8)   NG/GT(mL/kg) 193(4.3)   193(4.3)   IV Piggyback(mL/kg) 100.1(2.3)   100.1(2.3)   Shift Total(mL/kg) 1126.8(25.4)   1126.8(25.4)   OUTPUT   Urine(mL/kg/hr) 1455(4.1)   1455   Shift Total(mL/kg) 1455(32.8)   1455(32.8)   Weight (kg) 44.4 44.4 44.4 44.4     Wt Readings from Last 3 Encounters:   04/04/24 44.4 kg (97 lb 14.2 oz)     Body mass index is 19.12 kg/m².        PHYSICAL EXAM:  Physical Exam  Constitutional:       General: She is not in acute distress.     Appearance: Normal appearance. She is not ill-appearing.   HENT:      Head: Normocephalic.   Cardiovascular:      Rate and Rhythm: Normal rate and regular rhythm.      Pulses: Normal pulses.      Heart sounds: Normal heart sounds. No murmur heard.     No friction rub.   Pulmonary:      Effort: Pulmonary effort is normal. No respiratory distress.      Breath sounds: Normal breath sounds. No stridor.      Comments: INTUBATED AND SEDATED  Abdominal:      General: Abdomen is flat. There is no distension.      Tenderness: There is no abdominal tenderness.   Musculoskeletal:         General: No swelling, tenderness or deformity.   Skin:     Coloration: Skin is not jaundiced or pale.   Neurological:      Mental Status: She is alert.      Comments: SEDATED WITH PROPOFOL       MEDICATIONS:  Scheduled Meds:   ampicillin-sulbactam  3,000 mg IntraVENous Q6H    sodium chloride flush  5-40 mL IntraVENous 2 times per day    enoxaparin  40 mg SubCUTAneous Daily     Continuous Infusions:   midazolam 
  04/07/24 0200 (!) 82/50 99.7 °F (37.6 °C) -- 72 13 99 %   04/07/24 0130 (!) 92/57 100 °F (37.8 °C) -- 77 12 100 %   04/07/24 0100 (!) 83/48 100.4 °F (38 °C) -- 84 12 99 %   04/07/24 0030 (!) 85/50 (!) 100.6 °F (38.1 °C) -- 81 11 100 %   04/07/24 0000 (!) 96/59 (!) 100.6 °F (38.1 °C) Bladder 84 13 100 %   04/06/24 2330 (!) 97/57 100.4 °F (38 °C) -- 89 13 100 %   04/06/24 2300 (!) 87/53 100.4 °F (38 °C) -- 81 14 100 %   04/06/24 2230 (!) 84/48 (!) 100.9 °F (38.3 °C) -- 84 14 100 %   04/06/24 2200 (!) 88/48 (!) 100.9 °F (38.3 °C) -- 82 14 100 %           Intake/Output Summary (Last 24 hours) at 4/7/2024 0555  Last data filed at 4/7/2024 0523  Gross per 24 hour   Intake 3356.15 ml   Output 3305 ml   Net 51.15 ml       Date 04/07/24 0000 - 04/07/24 2359   Shift 0914-8647 0172-8725 5760-5664 24 Hour Total   INTAKE   I.V.(mL/kg) 806.5(18.3)   806.5(18.3)   NG/GT(mL/kg) 95(2.2)   95(2.2)   IV Piggyback(mL/kg) 200(4.5)   200(4.5)   Shift Total(mL/kg) 1101.5(25)   1101.5(25)   OUTPUT   Urine(mL/kg/hr) 175   175   Shift Total(mL/kg) 175(4)   175(4)   Weight (kg) 44 44 44 44       Wt Readings from Last 3 Encounters:   04/06/24 44 kg (97 lb)     Body mass index is 18.94 kg/m².        PHYSICAL EXAM:  Physical Exam  Constitutional:       General: She is not in acute distress.     Appearance: Normal appearance. She is not ill-appearing.   HENT:      Head: Normocephalic.   Cardiovascular:      Rate and Rhythm: Normal rate and regular rhythm.      Pulses: Normal pulses.      Heart sounds: Normal heart sounds. No murmur heard.     No friction rub.   Pulmonary:      Effort: Pulmonary effort is normal. No respiratory distress.      Breath sounds: Normal breath sounds. No stridor.      Comments: INTUBATED AND SEDATED  Abdominal:      General: Abdomen is flat. There is no distension.      Tenderness: There is no abdominal tenderness.   Musculoskeletal:         General: No swelling, tenderness or deformity.   Skin:     Coloration: Skin 
other patient in ICU and what seems like an apparent syncopal episode when she became unresponsive apparently was not hypotensive and not hypoglycemic was taken to ED where she needed intubation.  There was no arrhythmias reported no hypotension was reported no hypoglycemia was reported patient was intubated in the emergency room placed on ventilator and was admitted to medical ICU.  She had been restless and agitated intermittently when they try to wean down.  Difficult to find out her medications but apparently she has multiple medications.  Apparently she had history of admission to [facility] more than a month ago similar episode and was on ventilator at that time apparently was medicine overdose.  Her medication is still not confirmed this morning and will need to call pharmacy but apparently she is on lithium and other medication.  Her EKG showed QTc was 418 normal.  Patient also had received activated charcoal in the emergency room and poison control was contacted from their without any significant intervention needed.  This morning she was on propofol and fentanyl drip.  Ventilator setting PRVC/14/360/5/40%.  ABG was 7.3 8/40/648 pO2.  Her initial electrolytes were okay BUN/creatinine was normal bicarbonate was 23 no anion gap was seen urine was negative except cannabinoids lithium level was less than 0.2 Tylenol less than 5 and salicylate less than 0.5.  She was hypovolemic and received fluid which she is getting culture were done so far negative urinalysis was negative for infection this morning venous blood gas was 7.4 1/41/40/26.  CT head was negative chest x-ray was negative for infiltrate.    Will continue will try to wean down the sedation.  Continue with IV fluid.  2D echocardiogram.  Continue monitor blood sugar.  Start tube feeding.  Potassium replacement and follow potassium.  Continue with current ventilatory support.  Will need psychiatric evaluation as patient is on lithium and if

## 2024-04-11 LAB — CRP SERPL HS-MCNC: 4.1 MG/L (ref 0–5)

## 2024-04-11 PROCEDURE — 1240000000 HC EMOTIONAL WELLNESS R&B

## 2024-04-11 PROCEDURE — 99232 SBSQ HOSP IP/OBS MODERATE 35: CPT | Performed by: INTERNAL MEDICINE

## 2024-04-11 PROCEDURE — 6370000000 HC RX 637 (ALT 250 FOR IP): Performed by: NURSE PRACTITIONER

## 2024-04-11 PROCEDURE — 36415 COLL VENOUS BLD VENIPUNCTURE: CPT

## 2024-04-11 PROCEDURE — 6370000000 HC RX 637 (ALT 250 FOR IP): Performed by: PSYCHIATRY & NEUROLOGY

## 2024-04-11 PROCEDURE — APPSS30 APP SPLIT SHARED TIME 16-30 MINUTES: Performed by: NURSE PRACTITIONER

## 2024-04-11 PROCEDURE — 86140 C-REACTIVE PROTEIN: CPT

## 2024-04-11 PROCEDURE — 99232 SBSQ HOSP IP/OBS MODERATE 35: CPT | Performed by: PSYCHIATRY & NEUROLOGY

## 2024-04-11 PROCEDURE — 92526 ORAL FUNCTION THERAPY: CPT

## 2024-04-11 RX ORDER — BUSPIRONE HYDROCHLORIDE 10 MG/1
10 TABLET ORAL 3 TIMES DAILY
Status: DISCONTINUED | OUTPATIENT
Start: 2024-04-11 | End: 2024-04-12 | Stop reason: HOSPADM

## 2024-04-11 RX ORDER — TRAZODONE HYDROCHLORIDE 100 MG/1
100 TABLET ORAL NIGHTLY
Status: DISCONTINUED | OUTPATIENT
Start: 2024-04-11 | End: 2024-04-12 | Stop reason: HOSPADM

## 2024-04-11 RX ADMIN — LITHIUM CARBONATE 300 MG: 300 CAPSULE, GELATIN COATED ORAL at 13:52

## 2024-04-11 RX ADMIN — BUSPIRONE HYDROCHLORIDE 10 MG: 10 TABLET ORAL at 13:52

## 2024-04-11 RX ADMIN — BUTALBITA,ACETAMINOPHEN AND CAFFEINE 2 CAPSULE: 50; 300; 40 CAPSULE ORAL at 08:51

## 2024-04-11 RX ADMIN — Medication 1 LOZENGE: at 12:59

## 2024-04-11 RX ADMIN — Medication 3 MG: at 22:11

## 2024-04-11 RX ADMIN — TRAZODONE HYDROCHLORIDE 100 MG: 100 TABLET ORAL at 22:12

## 2024-04-11 RX ADMIN — METAXALONE 800 MG: 800 TABLET ORAL at 07:52

## 2024-04-11 RX ADMIN — METAXALONE 800 MG: 800 TABLET ORAL at 13:52

## 2024-04-11 RX ADMIN — METAXALONE 800 MG: 800 TABLET ORAL at 22:11

## 2024-04-11 RX ADMIN — LITHIUM CARBONATE 300 MG: 300 CAPSULE, GELATIN COATED ORAL at 07:52

## 2024-04-11 RX ADMIN — BUSPIRONE HYDROCHLORIDE 10 MG: 10 TABLET ORAL at 22:11

## 2024-04-11 RX ADMIN — LITHIUM CARBONATE 300 MG: 300 CAPSULE, GELATIN COATED ORAL at 22:11

## 2024-04-11 RX ADMIN — BUTALBITA,ACETAMINOPHEN AND CAFFEINE 2 CAPSULE: 50; 300; 40 CAPSULE ORAL at 16:55

## 2024-04-11 ASSESSMENT — PAIN DESCRIPTION - LOCATION
LOCATION: THROAT
LOCATION: HEAD

## 2024-04-11 ASSESSMENT — PAIN SCALES - GENERAL
PAINLEVEL_OUTOF10: 1
PAINLEVEL_OUTOF10: 4
PAINLEVEL_OUTOF10: 3
PAINLEVEL_OUTOF10: 6
PAINLEVEL_OUTOF10: 0

## 2024-04-11 NOTE — PLAN OF CARE
Problem: Safety - Adult  Goal: Free from fall injury  4/10/2024 2346 by Vesta Nunes, RN  Outcome: Progressing  Note: Pt remains free of falls and verbalizes understanding of individual fall risks.  Pt wearing non skid footwear and encouraged to seek out staff for any assistance needed. Ambulatory. Up as tolerated. Social with peers.       Problem: Self Harm/Suicidality  Goal: Will have no self-injury during hospital stay  Description: INTERVENTIONS:  1.  Ensure constant observer at bedside with Q15M safety checks  2.  Maintain a safe environment  3.  Secure patient belongings  4.  Ensure family/visitors adhere to safety recommendations  5.  Ensure safety tray has been added to patient's diet order  6.  Every shift and PRN: Re-assess suicidal risk via Frequent Screener    4/10/2024 2346 by Vesta Nunes, RN  Outcome: Progressing  Note: Pt denies suicidal ideation. Denies thoughts of self harm and is agreeable to seeking out should thoughts of self harm arise. Safe environment maintained. Social with peers, cooperative, friendly. Every 15 minute checks for safety cont per unit policy.  Will cont to monitor for safety and provides support and reassurance as needed.

## 2024-04-11 NOTE — CONSULTS
kg (95 lb 7.4 oz), 95.5 % IBW.    Current BMI (kg/m2): 18.6  Usual Body Weight: 44.4 kg (97 lb 14.2 oz) (4/4)  % Weight Change (Calculated): -2.5                    BMI Categories: Normal Weight (BMI 18.5-24.9) (Low end normal)    Estimated Daily Nutrient Needs:  Energy Requirements Based On: Formula  Weight Used for Energy Requirements: Admission  Energy (kcal/day): 1300 based on [place]-St. Jeor with 1.3 factor. 6709-1704 kcal for wt gain (additional 250-500 kcal per day)  Weight Used for Protein Requirements: Admission  Protein (g/day): 52-65 based on 1.2-1.5 gm per kg  Method Used for Fluid Requirements: 1 ml/kcal  Fluid (ml/day): or per physician    Nutrition Diagnosis:   Inadequate oral intake related to swallowing difficulty, psychological cause or life stress (recent medical condition; multiple allergies and intolerances) as evidenced by poor intake prior to admission, weight loss, intake 51-75%    Nutrition Interventions:   Food and/or Nutrient Delivery: Continue Current Diet     Coordination of Nutrition Care: Continue to monitor while inpatient, Speech Therapy       Goals:     Goals: PO intake 75% or greater       Nutrition Monitoring and Evaluation:      Food/Nutrient Intake Outcomes: Food and Nutrient Intake  Physical Signs/Symptoms Outcomes: Biochemical Data, Chewing or Swallowing, Weight    Discharge Planning:    Too soon to determine     Paula Almanza RD, LD  Contact: [phone]

## 2024-04-11 NOTE — PLAN OF CARE
Problem: Depression/Self Harm  Goal: Effect of psychiatric condition will be minimized and patient will be protected from self harm  Description: INTERVENTIONS:  1. Assess impact of patient's symptoms on level of functioning, self care needs and offer support as indicated  2. Assess patient/family knowledge of depression, impact on illness and need for teaching  3. Provide emotional support, presence and reassurance  4. Assess for possible suicidal thoughts or ideation. If patient expresses suicidal thoughts or statements do not leave alone, initiate Suicide Precautions, move to a room close to the nursing station and obtain sitter  5. Initiate consults as appropriate with Mental Health Professional, Spiritual Care, Psychosocial CNS, and consider a recommendation to the LIP for a Psychiatric Consultation  Outcome: Progressing   1:1 with pt x ten minutes.  Pt encouraged to attend unit programming and interact with peers and staff.  Pt also encouraged to tend to hygiene and ADLs.  Pt encouraged to discuss feelings with staff and feedback and reassurance provided.    Pt denies thoughts of self harm and is agreeable to seeking out should thoughts of self harm arise.  Safe environment maintained.  Q15 minute checks for safety cont per unit policy.  Will cont to monitor for safety and provides support and reassurance as needed.    Pt remains focused on discharge. Pt wants to attend spouses .

## 2024-04-12 VITALS
BODY MASS INDEX: 18.74 KG/M2 | SYSTOLIC BLOOD PRESSURE: 124 MMHG | OXYGEN SATURATION: 98 % | DIASTOLIC BLOOD PRESSURE: 88 MMHG | HEART RATE: 88 BPM | HEIGHT: 60 IN | RESPIRATION RATE: 14 BRPM | TEMPERATURE: 98 F | WEIGHT: 95.46 LBS

## 2024-04-12 LAB
MICROORGANISM SPEC CULT: NORMAL
MICROORGANISM SPEC CULT: NORMAL
SERVICE CMNT-IMP: NORMAL
SERVICE CMNT-IMP: NORMAL
SPECIMEN DESCRIPTION: NORMAL
SPECIMEN DESCRIPTION: NORMAL

## 2024-04-12 PROCEDURE — 99239 HOSP IP/OBS DSCHRG MGMT >30: CPT | Performed by: PSYCHIATRY & NEUROLOGY

## 2024-04-12 PROCEDURE — 92526 ORAL FUNCTION THERAPY: CPT

## 2024-04-12 PROCEDURE — 6370000000 HC RX 637 (ALT 250 FOR IP): Performed by: PSYCHIATRY & NEUROLOGY

## 2024-04-12 PROCEDURE — 6370000000 HC RX 637 (ALT 250 FOR IP): Performed by: NURSE PRACTITIONER

## 2024-04-12 RX ORDER — METAXALONE 800 MG/1
800 TABLET ORAL 3 TIMES DAILY
Qty: 90 TABLET | Refills: 0 | Status: SHIPPED | OUTPATIENT
Start: 2024-04-12

## 2024-04-12 RX ORDER — LITHIUM CARBONATE 300 MG/1
300 TABLET, FILM COATED, EXTENDED RELEASE ORAL 3 TIMES DAILY
Qty: 90 TABLET | Refills: 0 | Status: SHIPPED | OUTPATIENT
Start: 2024-04-12

## 2024-04-12 RX ORDER — ONDANSETRON 8 MG/1
8 TABLET, ORALLY DISINTEGRATING ORAL DAILY PRN
Qty: 30 TABLET | Refills: 0 | Status: SHIPPED | OUTPATIENT
Start: 2024-04-12

## 2024-04-12 RX ORDER — TRAZODONE HYDROCHLORIDE 50 MG/1
50 TABLET ORAL NIGHTLY PRN
Qty: 30 TABLET | Refills: 0 | Status: SHIPPED | OUTPATIENT
Start: 2024-04-12

## 2024-04-12 RX ADMIN — BUSPIRONE HYDROCHLORIDE 10 MG: 10 TABLET ORAL at 07:55

## 2024-04-12 RX ADMIN — BUTALBITA,ACETAMINOPHEN AND CAFFEINE 2 CAPSULE: 50; 300; 40 CAPSULE ORAL at 07:53

## 2024-04-12 RX ADMIN — LITHIUM CARBONATE 300 MG: 300 CAPSULE, GELATIN COATED ORAL at 07:55

## 2024-04-12 RX ADMIN — ONDANSETRON 8 MG: 4 TABLET, ORALLY DISINTEGRATING ORAL at 07:55

## 2024-04-12 RX ADMIN — LITHIUM CARBONATE 300 MG: 300 CAPSULE, GELATIN COATED ORAL at 13:53

## 2024-04-12 RX ADMIN — METAXALONE 800 MG: 800 TABLET ORAL at 07:55

## 2024-04-12 RX ADMIN — BUSPIRONE HYDROCHLORIDE 10 MG: 10 TABLET ORAL at 13:53

## 2024-04-12 RX ADMIN — METAXALONE 800 MG: 800 TABLET ORAL at 13:53

## 2024-04-12 ASSESSMENT — PAIN SCALES - GENERAL
PAINLEVEL_OUTOF10: 6
PAINLEVEL_OUTOF10: 6

## 2024-04-12 ASSESSMENT — PAIN - FUNCTIONAL ASSESSMENT: PAIN_FUNCTIONAL_ASSESSMENT: ACTIVITIES ARE NOT PREVENTED

## 2024-04-12 NOTE — PLAN OF CARE
Problem: Safety - Adult  Goal: Free from fall injury  Outcome: Progressing  Note: Patient remains free from falls. Staff ensure safety by providing safety checks on the unit intermittently and every 15 minutes.       Problem: Coping  Goal: Pt/Family able to verbalize concerns and demonstrate effective coping strategies  Description: INTERVENTIONS:  1. Assist patient/family to identify coping skills, available support systems and cultural and spiritual values  2. Provide emotional support, including active listening and acknowledgement of concerns of patient and caregivers  3. Reduce environmental stimuli, as able  4. Instruct patient/family in relaxation techniques, as appropriate  5. Assess for spiritual pain/suffering and initiate Spiritual Care, Psychosocial Clinical Specialist consults as needed  Outcome: Progressing  Note: Patient demonstrates effective coping ability, with optimism for the future. Staff ensure safety by providing safety checks on the unit intermittently and every 15 minutes.       Problem: Depression/Self Harm  Goal: Effect of psychiatric condition will be minimized and patient will be protected from self harm  Description: INTERVENTIONS:  1. Assess impact of patient's symptoms on level of functioning, self care needs and offer support as indicated  2. Assess patient/family knowledge of depression, impact on illness and need for teaching  3. Provide emotional support, presence and reassurance  4. Assess for possible suicidal thoughts or ideation. If patient expresses suicidal thoughts or statements do not leave alone, initiate Suicide Precautions, move to a room close to the nursing station and obtain sitter  5. Initiate consults as appropriate with Mental Health Professional, Spiritual Care, Psychosocial CNS, and consider a recommendation to the [facility]IP for a Psychiatric Consultation  Outcome: Progressing  Note: Patient displays anxiety and depression. Staff ensure safety by providing safety

## 2024-04-12 NOTE — BH NOTE
[facility]  Discharge Note    Pt discharged with followings belongings:   Dental Appliances: None  Vision - Corrective Lenses: None  Hearing Aid: None  Jewelry: Necklace  Body Piercings Removed: N/A  Clothing: Footwear, Gloves, Jacket/Coat, Pants, Shirt, Socks, Sweater, Undergarments  Other Valuables: Lighter/Matches (vape,lighter,glass jars,nail,clippers,)   Valuables sent home with or returned to patient. Patient educated on aftercare instructions: yes  Information faxed to follow up by staff  at 8:54 AM .Patient verbalize understanding of AVS:  yes.    Status EXAM upon discharge:  Mental Status and Behavioral Exam  Normal: No  Level of Assistance: Independent/Self  Facial Expression: Flat, Sad  Affect: Appropriate  Level of Consciousness: Alert  Frequency of Checks: 4 times per hour, close  Mood:Normal: No  Mood: Depressed, Anxious  Motor Activity:Normal: Yes  Motor Activity: Decreased  Eye Contact: Good  Observed Behavior: Cooperative, Friendly  Sexual Misconduct History: Current - no  Preception: [place] to person, [place] to time, [place] to place, [place] to situation  Attention:Normal: Yes  Attention: Distractible  Thought Processes: Circumstantial  Thought Content:Normal: Yes  Thought Content: Preoccupations  Depression Symptoms: Feelings of hopelessess  Anxiety Symptoms: Generalized  Mar[place] Symptoms: No problems reported or observed.  Hallucinations: None  Delusions: No  Memory:Normal: Yes  Insight and Judgment: Yes  Insight and Judgment: Poor insight    Tobacco Screening:  Practical Counseling, on admission, javier X, if applicable and completed (first 3 are required if patient doesn't refuse):            ( ) Recognizing danger situations (included triggers and roadblocks)                    ( ) Coping skills (new ways to manage stress,relaxation techniques, changing routine, distraction)                                                           ( ) Basic information about quitting (benefits of

## 2024-04-12 NOTE — BH NOTE
Patient given tobacco quitline number 71582188281 at this time, refusing to call at this time, states \" I just dont want to quit now\"- patient given information as to the dangers of long term tobacco use. Continue to reinforce the importance of tobacco cessation.

## 2024-04-12 NOTE — GROUP NOTE
Group Therapy Note    Date: 2024    Group Start Time: 1105  Group End Time: 1155  Group Topic: Cognitive Skills    Jenny Boyle CTRS        Group Therapy Note    Attendees:      Patient's Goal: To increase social interaction, practice decision making, concentration, and   communication skills      Notes:  Pt was pleasant and cooperative, and participated fully in group task. Pt was   able to practice decision making, concentration, and communication skills independently.       Status After Intervention:  Improved     Participation Level: Active Listener and Interactive r/t  task and topic, supportive     Participation Quality: Appropriate, Attentive, engaged r/t task ,supportive     Speech:  Normal      Thought Process/Content: Logical, linear r/t task .       Affective Functioning: Congruent ,brightened      Mood: Cooperative, euthymic r/t task. Pt expressed concern r/t loudness/restlessness of peer in group  being overstimulating and increasing anxiety. RT was able to modify seating/space and set limits with loud   peer and pt was able to particpate in task. Pt also engaged in appropriate prompting of peer to reduce volume  and be considerate of peers in group.    Pt talked with pt 1:1 about attending her 's  and getting support through therapy and her   marilyn to work through grief.      Level of consciousness:  Alert, and Attentive      Response to Learning: Able to verbalize current knowledge/experience, Able to   verbalize/acknowledge new learning, and Progressing to goal      Endings: None Reported      Modes of Intervention: Education, Support, Socialization, and Problem-solving    Discipline Responsible: Psychoeducational Specialist      Signature:  KHLOE FOREMAN    
                                                                      Group Therapy Note    Date: 4/10/2024    Group Start Time: 0900  Group End Time: 0915  Group Topic: Community Meeting    Radha De La Torre        Group Therapy Note    Attendees: 7/11       Patient's Goal:  Work on treatment plan     Status After Intervention:  Unchanged    Participation Level: Active Listener and Interactive    Participation Quality: Appropriate and Attentive      Speech:  normal      Thought Process/Content: Logical      Affective Functioning: Congruent      Mood: euthymic      Level of consciousness:  Alert and Oriented x4      Response to Learning: Able to verbalize current knowledge/experience and Able to verbalize/acknowledge new learning      Endings: None Reported    Modes of Intervention: Education and Support      Discipline Responsible: Behavorial Health Tech      Signature:  Radha Abel    
                                                                      Group Therapy Note    Date: 4/10/2024    Group Start Time: 1003  Group End Time: 1030  Group Topic: Psychoeducation    [facility] Tata Prieto MSW        Group Therapy Note    Attendees: 7/11       Patient's Goal:  To understand the definition of gratitude, understand why gratitude is important, and to identify ways to show gratitude daily.     Notes:  Group discussion on what gratitude means, why gratitude is important, and ways to show gratitude daily.     Status After Intervention:  Improved    Participation Level: Active Listener and Interactive    Participation Quality: Appropriate, Attentive, and Sharing      Speech:  normal      Thought Process/Content: Logical      Affective Functioning: Congruent      Mood: euphoric      Level of consciousness:  Alert      Response to Learning: Able to verbalize current knowledge/experience, Able to verbalize/acknowledge new learning, Able to retain information, and Capable of insight      Endings: None Reported    Modes of Intervention: Education and Support      Discipline Responsible: /Counselor      Signature:  KANDICE Suarez    
                                                                      Group Therapy Note    Date: 4/10/2024    Group Start Time: 1100  Group End Time: 1145  Group Topic: Cognitive Skills    [facility] Jenny Wright CTRS        Group Therapy Note    Attendees: 7/10     Patient's Goal: To increase social interaction, practice decision making and deductive reasoning skills,  and communication skills.        Notes:  Pt was pleasant and cooperative, and participated fully in group task. Pt was   able to practice decision making and deductive reasoning skills,and communication skills  mostly independently.       Status After Intervention:  Improved     Participation Level: Active Listener and Interactive r/t  task and topic, cooperative     Participation Quality: Appropriate, Attentive, engaged r/t task ,cooperative     Speech:  Normal content but softly spoken/somewhat hoarse (pt was intubated prior to admission to unit)      Thought Process/Content: Logical, linear r/t task .       Affective Functioning: Blunted ,brightened briefly r/t humor in task x2      Mood: Cooperative, restricted. Pt appears to have bonded with a female peer that she spends time   doing puzzles with. Pt looks to peers (younger female) during task for reassurance with her answers/choices   at regular intervals.        Level of consciousness:  Alert, and Attentive      Response to Learning: Able to verbalize current knowledge/experience, Able to   verbalize/acknowledge new learning, and Progressing to goal      Endings: None Reported      Modes of Intervention: Education, Support, Socialization, and Problem-solving    Discipline Responsible: Psychoeducational Specialist      Signature:  KHLOE FOREMAN           
                                                                      Group Therapy Note    Date: 4/10/2024    Group Start Time: 1330  Group End Time: 1430  Group Topic: Cognitive Skills    Jenny Boyle CTRS        Group Therapy Note    Attendees: 7/11     Patient's Goal: To increase social interaction, practice expressing feelings; identifying self calming   environments, items, activities, and resources using the senses through creative expression   and discussion.         Notes:  Pt was able to participate in group task and discussion -pt continues to look to/wait for   reassurance from younger peer.     Pt was able to practice expressing feelings r/t her marilyn being a source of strength. Pt expressed her   feelings of comfort and support through \"Yahweh\" through her art work which she focused on intently   during group.    Pt shared individually, and was attentive to peers when sharing.          Status After Intervention:  Improved      Participation Level: Active Listener and Interactive r/t  task and topic     Participation Quality:  Attentive , engaged r/t task and topic      Speech:  Normal content, softly spoken, somewhat hoarse r/t sore throat       Thought Process/Content: Logical and linear r/t task and topic.      Affective Functioning: Blunted      Mood: Cooperative, appears to rely on peer for reassurance/follows their actions/lead at times in small ways.   Calm, serious.      Level of consciousness:  Alert, and Attentive      Response to Learning: Able to verbalize some  limited current knowledge/experience, Able to   verbalize/acknowledge new learning , and Progressing to goal      Endings: None Reported      Modes of Intervention: Education, Support, Socialization, and Problem-solving    Discipline Responsible: Psychoeducational Specialist      Signature:  KHLOE FOREMAN                        
                                                                      Group Therapy Note    Date: 4/11/2024    Group Start Time: 0900  Group End Time: 0930  Group Topic: Community Meeting    Maria Elena Armijo LPN             Patient's Goal:  get things in order preparing for discharge tomorrow       Status After Intervention:  Unchanged    Participation Level: Interactive    Participation Quality: Appropriate      Speech:  pressured      Thought Process/Content: Logical      Affective Functioning: Flat      Mood: anxious      Level of consciousness:  Alert      Response to Learning: Able to verbalize current knowledge/experience      Endings: None Reported    Modes of Intervention: Support      Discipline Responsible: Licensed Practical Nurse      Signature:  Maria Elena Fernandez LPN    
                                                                      Group Therapy Note    Date: 4/11/2024    Group Start Time: 1003  Group End Time: 1037  Group Topic: Psychoeducation    [facility]    Tata Sylvester MSW        Group Therapy Note    Attendees: 7/13     Patient was offered group therapy today but declined to participate despite encouragement from staff.  1:1 was offered.       Signature:  KANDICE Suarez    
                                                                      Group Therapy Note    Date: 4/11/2024    Group Start Time: 1430  Group End Time: 1540  Group Topic: Cognitive Skills    [facility] Jenny Wright CTRS        Group Therapy Note    Attendees: 7/12     Patient's Goal: To increase social interaction, practice self expression, explore coping skills, and   communication skills through creative expression and discussion.      Notes:  Pt was pleasant and cooperative, and participated fully in group task and discussion.   Pt was able to practice self expression, explore coping skills, and communication skills through   creative expression and discussion.     Status After Intervention:  Improved     Participation Level: Active Listener and Interactive r/t  alternate task and topic, supportive     Participation Quality: Appropriate, Attentive, engaged r/t alternate task ,supportive     Speech:  Normal content, softly spoken      Thought Process/Content: Logical, linear r/t task and topic      Affective Functioning: Blunted, pt expressed some sadness and anxiety that younger female peer she   had spent much time talking with, was being discharged. Peer, and other peers in group were supportive.      Mood: Cooperative, restricted, expressed some anxiety and sadness r/t would miss peer when they left.      Level of consciousness:  Alert, and Attentive      Response to Learning: Able to verbalize current knowledge/experience, Able to   verbalize/acknowledge new learning, and Progressing to goal      Endings: None Reported      Modes of Intervention: Education, Support, Socialization, and Problem-solving    Discipline Responsible: Psychoeducational Specialist      Signature:  KHLOE FOREMAN  
                                                                      Group Therapy Note    Date: 4/12/2024    Group Start Time: 0915  Group End Time: 0930  Group Topic: Community Meeting    Maria Elena Armijo LPN             Patient's Goal:  remain positive         Status After Intervention:  Improved    Participation Level: Interactive    Participation Quality: Appropriate      Speech:  normal      Thought Process/Content: Logical      Affective Functioning: Flat      Mood: anxious      Level of consciousness:  Alert      Response to Learning: Able to verbalize current knowledge/experience      Endings: None Reported    Modes of Intervention: Support      Discipline Responsible: Licensed Practical Nurse      Signature:  Maria Elena Fernandez LPN    
                                                                      Group Therapy Note    Date: 4/12/2024    Group Start Time: 1010  Group End Time: 1040  Group Topic: Psychoeducation    Tata Cyr MSW        Group Therapy Note    Attendees: 5/11       Patient's Goal:  To understand what positive affirmations are, why they are important, and to identify positive affirmations that can be said to increase positivity and self-confidence/ self-esteem.     Notes:  Group discussion on what positive affirmations are, why they are important, and reading through 101 positive affirmations that can be utilized to increase positivity and increase self confidence and self esteem.     Status After Intervention:  Improved    Participation Level: Active Listener and Interactive    Participation Quality: Appropriate, Attentive, Sharing, and Supportive      Speech:  normal      Thought Process/Content: Logical      Affective Functioning: Congruent      Mood: euthymic      Level of consciousness:  Alert      Response to Learning: Able to verbalize current knowledge/experience, Able to verbalize/acknowledge new learning, Able to retain information, and Capable of insight      Endings: None Reported    Modes of Intervention: Education and Support      Discipline Responsible: /Counselor      Signature:  KANDICE Suarez    
Discipline Responsible: Psychoeducational Specialist      Signature:  ROSALIA FOREMANS

## 2024-04-12 NOTE — TRANSITION OF CARE
Behavioral Health Transition Record to Provider    Patient Name: Rabia Rivera  YOB: 1980   Medical Record Number: [id number]  Date of Admission: 4/9/2024  8:27 PM   Date of Discharge: 04/12/24      Attending Provider: Ana Jerry,*   Discharging Provider: Claritza  To contact this individual call   and ask the  to page.  If unavailable, ask to be transferred to Behavioral Health Provider on call.  A Behavioral Health Provider will be available on call 24/7 and during holidays.    Primary Care Provider: Amelia Betancourt MD    Allergies   Allergen Reactions    [place] Sprouts [Brassica Oleracea]      Along with broccoli.    Diclofenac Potassium(Migraine)     Glutethimides     Lactose     Red Dye     Vistaril [Hydroxyzine]     Xeomin [Incobotulinumtoxina]        Reason for Admission: Patient overdosed on a handful of baclofen after  passed away in the ICU. Patient found delusional and sent to ED, charcoled, intubated on 4/4.     Admission Diagnosis: Depression with suicidal ideation [F32.A, R45.851]    * No surgery found *    Results for orders placed or performed during the hospital encounter of 04/09/24   C-Reactive Protein   Result Value Ref Range    CRP 4.1 0.0 - 5.0 mg/L       Immunizations administered during this encounter:   There is no immunization history on file for this patient.  Documentation of patient's or caregiver's refusal of influenza vaccine.    Screening for Metabolic Disorders for Patients on Antipsychotic Medications  (Data obtained from the EMR)    Estimated Body Mass Index  Body mass index is 18.64 kg/m².      Vital Signs/Blood Pressure  /88   Pulse 88   Temp 98 °F (36.7 °C) (Temporal)   Resp 14   Ht 1.524 m (5')   Wt 43.3 kg (95 lb 7.4 oz)   SpO2 98%   BMI 18.64 kg/m²      Fasting Blood Glucose or Hemoglobin A1c  No results found for: \"GLU\", \"GLUCPOC\"    No results found for: \"LABA1C\", \"RPZ3HUBX\"    Discharge

## 2024-04-12 NOTE — DISCHARGE INSTRUCTIONS
Information:  Medications:   Medication summary provided   I understand that I should take only the medications on my list.     -why and when I need to take each medicine.     -which side effects to watch for.     -that I should carry my medication information at all times in case of     Emergency situations.    I will take all of my medicines to follow up appointments.     -check with my physician or pharmacist before taking any new    Medication, over the counter product or drink alcohol.    -Ask about food, drug or dietary supplement interactions.    -discard old lists and update records with medication providers.    Notify Physician:  Notify physician if you notice:   Always call 911 if you feel your life is in danger  In case of an emergency call 911 immediately!  If 911 is not available call your local emergency medical system for help    Behavioral Health Follow Up:  Original Referral Source:.   Discharge Diagnosis: Depression with suicidal ideation [F32.A, R45.851]  Recommendations for Level of Care: continue medications attend follow up   Patient status at discharge: stable   My hospital  was: [facility]   Aftercare plan faxed: yes   -faxed by: staff   -date: 4/12/24   -time: 1700  Prescriptions: medications called into preferred pharmacy.     Smoking: Quit Smoking.   Call the [employer]'s smoking quitline at 2-600-52L-QUIT  Know the signs of a heart attack   If you have any of the following symptoms call 911 immediately, do not wait more    Than five minutes.    1. Pressure, fullness and/ or squeezing in the center of the chest spreading to    The jaw, neck or shoulder.    2. Chest discomfort with light headedness, fainting, sweating, nausea or    Shortness of breath.   3. Upper abdominal pressure or discomfort.   4. Lower chest pain, back pain, unusual fatigue, shortness of breath, nausea   Or dizziness.     General Information:   Questions regarding your bill: Call HELP program ([phone])

## 2024-04-12 NOTE — PROGRESS NOTES
Behavioral Services  Medicare Certification Upon Admission    I certify that this patient's inpatient psychiatric hospital admission is medically necessary for:    [x] (1) Treatment which could reasonably be expected to improve this patient's condition,       [x] (2) Or for diagnostic study;     AND     [x](2) The inpatient psychiatric services are provided while the individual is under the care of a physician and are included in the individualized plan of care.    Estimated length of stay/service 3-5 days    Plan for post-hospital care hc    Electronically signed by Ana Jerry MD on 4/10/2024 at 8:51 PM      
  Daily Progress Note  2024    Patient Name: Rabia Rivera    CHIEF COMPLAINT:  s/p suicide attempt, baclofen requiring intubation           SUBJECTIVE:    Patient is seen face-to-face today for a follow up assessment. Today rates her depression as 3-4/10 and states that her mood is \"hopeful.\" Patient has been attending groups today. States that her appetite has been \"okay,\" and her sleep has been \"poor\" but she did get a few hours of broken sleep last night. States that she would like to get out in time to attend her 's  as there \"are no do-overs for service.\"  She does exhibit elevated anxiety thought process is slow. Patient has had two signifiicant overdose attempts recently. Discussed with her at length regarding safety planning and patient states she has good support from her sister. She does identify that this will be a difficult weekend. She states that her 's will, will likely be read following the  and she will find out if she will be able to continue living in her home. This has caused her anxiety to be elevated. For now, she plans to return home.     Patient has been compliant with her scheduled psychotropic medication.  Trazodone was added to her regimen last night, and she states that she found it difficult to sleep before it was restarted.  She notes that she does not have any BuSpar or Fioricet at home.     Patient's symptoms are improving.  Able to identify supportive factors.  Feeling more constructive and forward-looking.  Denying any suicidal intent or thoughts.      Appetite:  [x] Adequate/Unchanged  [] Increased  [] Decreased      Sleep:       [] Adequate/Unchanged  [] Fair  [x] Poor      Group Attendance on Unit:   [] Yes   [x] Selectively    [] No    Compliant with scheduled medications: [x] Yes  [] No    Received emergency medications in past 24 hrs: [] Yes   [x] No    Medication Side Effects: Denies         Mental Status Exam  Level of 
Pharmacy Med Education Group Note    Date: 4/10/24  Start Time: 1430  End Time: 1505    Number Participants in Group:  7    Goal:  Patient will demonstrate an understanding of the medication’s intended purpose and possible adverse effects  Topic: [place] for Pharmacy Med Ed Group    Discipline Responsible:     OT  AT  [facility].  RT     X Other       Participation Level:     None  Minimal      X Active Listener    X Interactive    Monopolizing         Participation Quality:    X Appropriate  Inappropriate     X       Attentive        Intrusive          Sharing        Resistant          Supportive        Lethargic       Affective:     X Congruent  Incongruent  Blunted  Flat    Constricted  Anxious  Elated  Angry    Labile  Depressed  Other         Cognitive:    X Alert  Oriented PPTP     Concentration   X G  F  P   Attention Span   X G  F  P   Short-Term Memory   X G  F  P   Long-Term Memory  G  F  P   ProblemSolving/  Decision Making  G  F  P   Ability to Process  Information   X G  F  P      Contributing Factors             Delusional             Hallucinating             Flight of Ideas             Other:       Modes of Intervention:    X Education   X Support  Exploration    Clarifying  Problem Solving  Confrontation    Socialization  Limit Setting  Reality Testing    Activity  Movement  Media    Other:            Response to Learning:    X Able to verbalize current knowledge/experience    Able to verbalize/acknowledge new learning    Able to retain information    Capable of insight    Able to change behavior    Progressing to goal    Other:        Comments:   Neris Bey, OrquideaD,  4/10/2024, 3:15 PM      
Pharmacy Medication History Note      List of current medications patient is taking is complete.    Patient was discharged from [facility] on 4/9/24.  No change to medication list from [facility].    Current Home Medication List at Time of Admission:  Prior to Admission medications    Medication Sig Start Date End Date Taking? Authorizing Provider   acyclovir (ZOVIRAX) 400 MG tablet Take 1 tablet by mouth 2 times daily    Jackson Soliman MD   butalbital-APAP-caffeine (FIORICET) -40 MG CAPS per capsule Take 2 capsules by mouth every 8 hours as needed for Headaches    ProviderJackson MD   lithium (LITHOBID) 300 MG extended release tablet Take 1 tablet by mouth 3 times daily    Jackson Soliman MD   metaxalone (SKELAXIN) 800 MG tablet Take 1 tablet by mouth 3 times daily    Jackson Soliman MD   traZODone (DESYREL) 50 MG tablet Take 1 tablet by mouth nightly as needed for Sleep    Jackson Soliman MD   ondansetron (ZOFRAN-ODT) 8 MG TBDP disintegrating tablet Place 1 tablet under the tongue daily as needed for Nausea or Vomiting    Provider, MD Jackson       Please let me know if you have any questions about this encounter. Thank you!    Electronically signed by Collette Bey RPH on 4/10/2024 at 9:55 AM      
Pt states she cannot eat choice of candy offered by RT due to having \"Lactose intolerance and Celiac Disease\" (not noticed on pt medical list ?).    Pt states only candy she could probably eat without major issues would be \"Reeses\" - not confirmed.  
RN has reviewed all [facility] documentation.      
RT ASSESSMENT TREATMENT GOALS    [x]Pt Goal: Pt will identify 1-2 positive coping skills by time of discharge.    []Pt Goal: Pt will identify 1-2 positive aspects of self by time of discharge.    []Pt Goal: Pt will remain on task/topic for 15-30 minutes during group by time of discharge.    []Pt Goal: Pt will identify 1-2 aspects of relapse prevention plan by time of discharge.    [x]Pt Goal: Pt will join in conversation with peers 1-2 times per group by time of discharge.    []Pt Goal: Pt will identify 1-2 new leisure interests by time of discharge.    []Pt Goal: Pt will not voice any delusional content by time of discharge.    
SPEECH LANGUAGE PATHOLOGY  Speech Language Pathology  [facility]    Dysphagia Treatment Note    Date: 4/10/2024  Patient’s Name: Rabia Rivera  MRN: [id number]  Diagnosis:   Patient Active Problem List   Diagnosis Code    Altered mental status R41.82    Episodes of decreased attentiveness R68.89    Depression with suicidal ideation F32.A, R45.851    Hypokalemia E87.6    Anemia, normocytic normochromic D64.9    Intentional overdose of baclofen ([facility]) T42.8X2A    Bipolar 1 disorder, depressed ([facility]) F31.9    Fibromyalgia M79.7    Migraines G43.909    Rheumatoid arthritis involving multiple joints ([facility]) M06.9    Bipolar I disorder, most recent episode depressed, severe without psychotic features ([facility]) F31.4       Pain Rating: Patient did appear or complain to be in pain.  Pain Location: N/A  Non-Pharmaceutical Pain Intervention: N/A    Dysphagia Treatment  Treatment time:8887-7784    Subjective: [x] Alert [x] Cooperative     [] Confused     [] Agitated    [] Lethargic    Objective/Assessment:    O/M Treatment Program:  Patient completed O/M exercises X 10 reps X 1 set with initial direct model. Patient completed abdi maneuver X 11 reps with initial direct model. ST educated patient on rationale/treatment program. Patient receptive to education. O/M Exercise Handout left with patient at bedside.    Bedside swallow test also completed, please refer to formal report.       Plan:  [x] Continue ST services    [] Discharge from ST:        Discharge recommendations:  [x] Further therapy recommended at discharge.   [] No therapy recommended at discharge.       Treatment completed by: ELIO Savage,  Clinician       
SPEECH LANGUAGE PATHOLOGY  Speech Language Pathology  [facility]    Dysphagia Treatment Note    Date: 4/11/2024  Patient’s Name: Rabia Rivera  MRN: [id number]  Diagnosis:   Patient Active Problem List   Diagnosis Code    Altered mental status R41.82    Episodes of decreased attentiveness R68.89    Depression with suicidal ideation F32.A, R45.851    Hypokalemia E87.6    Anemia, normocytic normochromic D64.9    Intentional overdose of baclofen ([facility]) T42.8X2A    Bipolar 1 disorder, depressed ([facility]) F31.9    Fibromyalgia M79.7    Migraines G43.909    Rheumatoid arthritis involving multiple joints ([facility]) M06.9    Bipolar I disorder, most recent episode depressed, severe without psychotic features ([facility]) F31.4       Pain Rating: Patient did appear or complain to be in pain.    Dysphagia Treatment  Treatment time:   8597-9507    Subjective: [x] Alert [x] Cooperative     [] Confused     [] Agitated    [] Lethargic    Objective/Assessment:    O/M Treatment Program:  Pt. Seen for dysphagia management.   Patient completed abdi maneuver X 18  reps  and simple tongue base strengthening exercises x9, 10 reps each. Pt. Reports she has been practicing exercises Danni on her own, x3/day, ST reinforced this.   Exercise sheet on pt. Bed.      Plan:  [x] Continue ST services    [] Discharge from ST:        Discharge recommendations:  [x] Further therapy recommended at discharge.   [] No therapy recommended at discharge.       Treatment completed by: Emperatriz Wilson M.A.CCC/SLP        
SPEECH LANGUAGE PATHOLOGY  Speech Language Pathology  [facility]    Dysphagia Treatment Note    Date: 4/12/2024  Patient’s Name: Rabia Rivera  MRN: [id number]  Diagnosis:   Patient Active Problem List   Diagnosis Code    Altered mental status R41.82    Episodes of decreased attentiveness R68.89    Depression with suicidal ideation F32.A, R45.851    Hypokalemia E87.6    Anemia, normocytic normochromic D64.9    Intentional overdose of baclofen ([facility]) T42.8X2A    Bipolar 1 disorder, depressed ([facility]) F31.9    Fibromyalgia M79.7    Migraines G43.909    Rheumatoid arthritis involving multiple joints ([facility]) M06.9    Bipolar I disorder, most recent episode depressed, severe without psychotic features ([facility]) F31.4       Pain Rating: Patient did appear or complain to be in pain.    Dysphagia Treatment  Treatment time:   0836-0901    Subjective: [x] Alert [x] Cooperative     [] Confused     [] Agitated    [] Lethargic    Objective/Assessment:    O/M Treatment Program:  Pt. Seen for dysphagia management.   Patient completed abdi maneuver x12 reps, Mendelson maneuver x8 reps and simple tongue base strengthening exercises x5, 20 reps each.  Pt. Reports she has been practicing exercises Danni on her own, x3/day, ST reinforced this.  Pt reports plan for d/c home today.  Extensive education provided re: continued thickened liquids (mildly thick consistency) at home following d/c, outpatient ST for dysphagia, practicing exercises outside of ST sessions and repeat MBS prior to diet advancement.  Pt had questions.  All questions answered by writer in detail.  Pt verbalized understanding and agreeable.      Continued ST recommended following d/c for dysphagia.       Plan:  [x] Continue ST services    [] Discharge from ST:        Discharge recommendations:  [x] Further therapy recommended at discharge.   [] No therapy recommended at discharge.       Treatment completed by: Rosalba Trejo M.A., CCC-SLP  
daily    Provider, MD Jackson   metaxalone (SKELAXIN) 800 MG tablet Take 1 tablet by mouth 3 times daily    ProviderJackson MD   traZODone (DESYREL) 50 MG tablet Take 1 tablet by mouth nightly as needed for Sleep    Jackson Soliman MD   ondansetron (ZOFRAN-ODT) 8 MG TBDP disintegrating tablet Place 1 tablet under the tongue daily as needed for Nausea or Vomiting    Provider, MD Jackson        Allergies:     Amory sprouts [brassica oleracea], Diclofenac potassium(migraine), Glutethimides, Lactose, Red dye, Vistaril [hydroxyzine], and Xeomin [incobotulinumtoxina]    Social History:     Tobacco:    reports that she has quit smoking. Her smoking use included cigarettes. She has never used smokeless tobacco.  Alcohol:      has no history on file for alcohol use.  Drug Use:  has no history on file for drug use.    Family History:     Family History   Problem Relation Age of Onset    Mult Sclerosis Mother     No Known Problems Father         History not known to patient as they are estranged       Review of Systems:     Positive and Negative as described in HPI.    CONSTITUTIONAL:  negative for fevers, chills, sweats, fatigue, weight loss  HEENT:  negative for vision, hearing changes, runny nose, throat pain  RESPIRATORY:  negative for shortness of breath, cough, congestion, wheezing.  CARDIOVASCULAR:  negative for chest pain, palpitations.  GASTROINTESTINAL:  negative for nausea, vomiting, diarrhea, constipation, change in bowel habits, abdominal pain   GENITOURINARY:  negative for difficulty of urination, burning with urination, frequency   INTEGUMENT:  negative for rash, skin lesions, easy bruising   HEMATOLOGIC/LYMPHATIC:  negative for swelling/edema   ALLERGIC/IMMUNOLOGIC:  negative for urticaria , itching  ENDOCRINE:  negative increase in drinking, increase in urination, hot or cold intolerance  MUSCULOSKELETAL:  negative joint pains, muscle aches, swelling of joints  NEUROLOGICAL:  negative 
Regular;Mildly Thick - cup    Vision/Hearing   Functional for assessment    Oral Motor Deficits  Labial: No impairment  Lingual: No impairment  Consistencies Administered: Regular;Mildly Thick - cup    Oral Phase Dysfunction  Oral Phase  Oral Phase: WNL     Indicators of Pharyngeal Phase Dysfunction   Pharyngeal Phase  Pharyngeal Phase: WNL          Education  Patient Education Response: Verbalizes understanding             Therapy Time  SLP Individual Minutes  Time In: 1529  Time Out: 1537  Minutes: 8        Emperatriz Wilson M.A.CCC/SLP      4/10/2024 4:09 PM

## 2024-04-13 NOTE — DISCHARGE SUMMARY
Provider Discharge Summary     Patient ID:  Rabia Rivera  [id number]  43 y.o.  1980    Admit date: 4/9/2024    Discharge date and time: 4/12/2024  10:26 PM     Admitting Physician: Ana Jerry MD     Discharge Physician: Ana Jerry MD    Admission Diagnoses: Depression with suicidal ideation [F32.A, R45.851]    Discharge Diagnoses:    Bipolar I disorder, most recent episode depressed, severe without psychotic features ([facility])     Patient Active Problem List   Diagnosis Code    Altered mental status R41.82    Episodes of decreased attentiveness R68.89    Depression with suicidal ideation F32.A, R45.851    Hypokalemia E87.6    Anemia, normocytic normochromic D64.9    Intentional overdose of baclofen ([facility]) T42.8X2A    Bipolar 1 disorder, depressed ([facility]) F31.9    Fibromyalgia M79.7    Migraines G43.909    Rheumatoid arthritis involving multiple joints ([facility]) M06.9    Bipolar I disorder, most recent episode depressed, severe without psychotic features ([facility]) F31.4        Admission Condition: poor    Discharged Condition: stable    Indication for Admission: threat to self    History of Present Illnes (present tense wording is of findings from admission exam and are not necessarily indicative of current findings):   Rabia Rivera is a 43 y.o. female who has a past medical history of bipolar disorder. Patient admitted from medical unit following an intentional overdose on baclofen requiring ICU level care and intubation.  Noted that patient overdosed on medication while her  was admitted to the hospital.  He passed away on 4/1.  She is admitted on an application for emergency admission and did not sign in upon presentation to [facility].     On assessment, patient is cooperative with discussion.  Thought process is linear but slowed.  She has difficulty speaking secondary to postintubation, and speech is slow, hoarse, and very low in volume.  She reports that it hurts to talk, and many